# Patient Record
Sex: MALE | Race: WHITE | NOT HISPANIC OR LATINO | Employment: OTHER | URBAN - METROPOLITAN AREA
[De-identification: names, ages, dates, MRNs, and addresses within clinical notes are randomized per-mention and may not be internally consistent; named-entity substitution may affect disease eponyms.]

---

## 2021-01-13 DIAGNOSIS — Z23 NEED FOR VACCINATION: ICD-10-CM

## 2022-04-29 ENCOUNTER — HOSPITAL ENCOUNTER (OUTPATIENT)
Facility: MEDICAL CENTER | Age: 82
End: 2022-04-30
Attending: EMERGENCY MEDICINE | Admitting: INTERNAL MEDICINE
Payer: COMMERCIAL

## 2022-04-29 ENCOUNTER — APPOINTMENT (OUTPATIENT)
Dept: RADIOLOGY | Facility: MEDICAL CENTER | Age: 82
End: 2022-04-29
Attending: PHYSICIAN ASSISTANT
Payer: COMMERCIAL

## 2022-04-29 ENCOUNTER — ANESTHESIA (OUTPATIENT)
Dept: SURGERY | Facility: MEDICAL CENTER | Age: 82
End: 2022-04-29
Payer: COMMERCIAL

## 2022-04-29 ENCOUNTER — APPOINTMENT (OUTPATIENT)
Dept: RADIOLOGY | Facility: MEDICAL CENTER | Age: 82
End: 2022-04-29
Attending: EMERGENCY MEDICINE
Payer: COMMERCIAL

## 2022-04-29 ENCOUNTER — APPOINTMENT (OUTPATIENT)
Dept: RADIOLOGY | Facility: MEDICAL CENTER | Age: 82
End: 2022-04-29
Attending: ORTHOPAEDIC SURGERY
Payer: COMMERCIAL

## 2022-04-29 ENCOUNTER — ANESTHESIA EVENT (OUTPATIENT)
Dept: SURGERY | Facility: MEDICAL CENTER | Age: 82
End: 2022-04-29
Payer: COMMERCIAL

## 2022-04-29 DIAGNOSIS — S61.412A LACERATION OF LEFT HAND WITHOUT FOREIGN BODY, INITIAL ENCOUNTER: ICD-10-CM

## 2022-04-29 DIAGNOSIS — R73.9 HYPERGLYCEMIA: ICD-10-CM

## 2022-04-29 PROBLEM — D72.829 LEUCOCYTOSIS: Status: ACTIVE | Noted: 2022-04-29

## 2022-04-29 PROBLEM — R79.89 ELEVATED SERUM CREATININE: Status: ACTIVE | Noted: 2022-04-29

## 2022-04-29 LAB
ALBUMIN SERPL BCP-MCNC: 4.3 G/DL (ref 3.2–4.9)
ALBUMIN/GLOB SERPL: 1.7 G/DL
ALP SERPL-CCNC: 72 U/L (ref 30–99)
ALT SERPL-CCNC: 18 U/L (ref 2–50)
ANION GAP SERPL CALC-SCNC: 13 MMOL/L (ref 7–16)
AST SERPL-CCNC: 19 U/L (ref 12–45)
BASOPHILS # BLD AUTO: 0.4 % (ref 0–1.8)
BASOPHILS # BLD: 0.04 K/UL (ref 0–0.12)
BILIRUB SERPL-MCNC: 0.4 MG/DL (ref 0.1–1.5)
BUN SERPL-MCNC: 32 MG/DL (ref 8–22)
CALCIUM SERPL-MCNC: 9.1 MG/DL (ref 8.5–10.5)
CHLORIDE SERPL-SCNC: 106 MMOL/L (ref 96–112)
CO2 SERPL-SCNC: 20 MMOL/L (ref 20–33)
CREAT SERPL-MCNC: 1.59 MG/DL (ref 0.5–1.4)
EKG IMPRESSION: NORMAL
EOSINOPHIL # BLD AUTO: 0.05 K/UL (ref 0–0.51)
EOSINOPHIL NFR BLD: 0.4 % (ref 0–6.9)
ERYTHROCYTE [DISTWIDTH] IN BLOOD BY AUTOMATED COUNT: 51 FL (ref 35.9–50)
GFR SERPLBLD CREATININE-BSD FMLA CKD-EPI: 44 ML/MIN/1.73 M 2
GLOBULIN SER CALC-MCNC: 2.6 G/DL (ref 1.9–3.5)
GLUCOSE SERPL-MCNC: 134 MG/DL (ref 65–99)
HCT VFR BLD AUTO: 40.9 % (ref 42–52)
HGB BLD-MCNC: 13.8 G/DL (ref 14–18)
IMM GRANULOCYTES # BLD AUTO: 0.04 K/UL (ref 0–0.11)
IMM GRANULOCYTES NFR BLD AUTO: 0.4 % (ref 0–0.9)
LYMPHOCYTES # BLD AUTO: 1.54 K/UL (ref 1–4.8)
LYMPHOCYTES NFR BLD: 13.8 % (ref 22–41)
MCH RBC QN AUTO: 33.7 PG (ref 27–33)
MCHC RBC AUTO-ENTMCNC: 33.7 G/DL (ref 33.7–35.3)
MCV RBC AUTO: 99.8 FL (ref 81.4–97.8)
MONOCYTES # BLD AUTO: 0.78 K/UL (ref 0–0.85)
MONOCYTES NFR BLD AUTO: 7 % (ref 0–13.4)
NEUTROPHILS # BLD AUTO: 8.71 K/UL (ref 1.82–7.42)
NEUTROPHILS NFR BLD: 78 % (ref 44–72)
NRBC # BLD AUTO: 0 K/UL
NRBC BLD-RTO: 0 /100 WBC
PLATELET # BLD AUTO: 205 K/UL (ref 164–446)
PMV BLD AUTO: 9.7 FL (ref 9–12.9)
POTASSIUM SERPL-SCNC: 4.3 MMOL/L (ref 3.6–5.5)
PROT SERPL-MCNC: 6.9 G/DL (ref 6–8.2)
RBC # BLD AUTO: 4.1 M/UL (ref 4.7–6.1)
SODIUM SERPL-SCNC: 139 MMOL/L (ref 135–145)
WBC # BLD AUTO: 11.2 K/UL (ref 4.8–10.8)

## 2022-04-29 PROCEDURE — 700105 HCHG RX REV CODE 258: Performed by: INTERNAL MEDICINE

## 2022-04-29 PROCEDURE — 160009 HCHG ANES TIME/MIN: Performed by: ORTHOPAEDIC SURGERY

## 2022-04-29 PROCEDURE — 160002 HCHG RECOVERY MINUTES (STAT): Performed by: ORTHOPAEDIC SURGERY

## 2022-04-29 PROCEDURE — 64450 NJX AA&/STRD OTHER PN/BRANCH: CPT

## 2022-04-29 PROCEDURE — 700101 HCHG RX REV CODE 250: Performed by: STUDENT IN AN ORGANIZED HEALTH CARE EDUCATION/TRAINING PROGRAM

## 2022-04-29 PROCEDURE — 96365 THER/PROPH/DIAG IV INF INIT: CPT | Mod: XU

## 2022-04-29 PROCEDURE — 700111 HCHG RX REV CODE 636 W/ 250 OVERRIDE (IP): Performed by: INTERNAL MEDICINE

## 2022-04-29 PROCEDURE — 160027 HCHG SURGERY MINUTES - 1ST 30 MINS LEVEL 2: Performed by: ORTHOPAEDIC SURGERY

## 2022-04-29 PROCEDURE — 36415 COLL VENOUS BLD VENIPUNCTURE: CPT | Mod: XU

## 2022-04-29 PROCEDURE — 71045 X-RAY EXAM CHEST 1 VIEW: CPT

## 2022-04-29 PROCEDURE — A9270 NON-COVERED ITEM OR SERVICE: HCPCS | Performed by: INTERNAL MEDICINE

## 2022-04-29 PROCEDURE — 501330 HCHG SET, CYSTO IRRIG TUBING: Performed by: ORTHOPAEDIC SURGERY

## 2022-04-29 PROCEDURE — 160038 HCHG SURGERY MINUTES - EA ADDL 1 MIN LEVEL 2: Performed by: ORTHOPAEDIC SURGERY

## 2022-04-29 PROCEDURE — 85025 COMPLETE CBC W/AUTO DIFF WBC: CPT

## 2022-04-29 PROCEDURE — 36415 COLL VENOUS BLD VENIPUNCTURE: CPT

## 2022-04-29 PROCEDURE — 700111 HCHG RX REV CODE 636 W/ 250 OVERRIDE (IP)

## 2022-04-29 PROCEDURE — 96366 THER/PROPH/DIAG IV INF ADDON: CPT | Mod: XU

## 2022-04-29 PROCEDURE — 96375 TX/PRO/DX INJ NEW DRUG ADDON: CPT | Mod: XU

## 2022-04-29 PROCEDURE — 01830 ANES ARTHR/NDSC WRST/HND NOS: CPT | Performed by: STUDENT IN AN ORGANIZED HEALTH CARE EDUCATION/TRAINING PROGRAM

## 2022-04-29 PROCEDURE — 700111 HCHG RX REV CODE 636 W/ 250 OVERRIDE (IP): Performed by: ORTHOPAEDIC SURGERY

## 2022-04-29 PROCEDURE — 82607 VITAMIN B-12: CPT

## 2022-04-29 PROCEDURE — 700105 HCHG RX REV CODE 258: Performed by: EMERGENCY MEDICINE

## 2022-04-29 PROCEDURE — 93005 ELECTROCARDIOGRAM TRACING: CPT | Performed by: EMERGENCY MEDICINE

## 2022-04-29 PROCEDURE — G0378 HOSPITAL OBSERVATION PER HR: HCPCS

## 2022-04-29 PROCEDURE — 84443 ASSAY THYROID STIM HORMONE: CPT

## 2022-04-29 PROCEDURE — 700102 HCHG RX REV CODE 250 W/ 637 OVERRIDE(OP): Performed by: INTERNAL MEDICINE

## 2022-04-29 PROCEDURE — 90471 IMMUNIZATION ADMIN: CPT

## 2022-04-29 PROCEDURE — 700105 HCHG RX REV CODE 258: Performed by: STUDENT IN AN ORGANIZED HEALTH CARE EDUCATION/TRAINING PROGRAM

## 2022-04-29 PROCEDURE — 99100 ANES PT EXTEME AGE<1 YR&>70: CPT | Performed by: STUDENT IN AN ORGANIZED HEALTH CARE EDUCATION/TRAINING PROGRAM

## 2022-04-29 PROCEDURE — 160035 HCHG PACU - 1ST 60 MINS PHASE I: Performed by: ORTHOPAEDIC SURGERY

## 2022-04-29 PROCEDURE — 96367 TX/PROPH/DG ADDL SEQ IV INF: CPT | Mod: XU

## 2022-04-29 PROCEDURE — 99226 PR SUBSEQUENT OBSERVATION CARE,LEVEL III: CPT | Performed by: INTERNAL MEDICINE

## 2022-04-29 PROCEDURE — 73130 X-RAY EXAM OF HAND: CPT | Mod: LT

## 2022-04-29 PROCEDURE — 501838 HCHG SUTURE GENERAL: Performed by: ORTHOPAEDIC SURGERY

## 2022-04-29 PROCEDURE — 90715 TDAP VACCINE 7 YRS/> IM: CPT | Performed by: EMERGENCY MEDICINE

## 2022-04-29 PROCEDURE — 80053 COMPREHEN METABOLIC PANEL: CPT

## 2022-04-29 PROCEDURE — 700111 HCHG RX REV CODE 636 W/ 250 OVERRIDE (IP): Performed by: EMERGENCY MEDICINE

## 2022-04-29 PROCEDURE — 99291 CRITICAL CARE FIRST HOUR: CPT

## 2022-04-29 PROCEDURE — 700111 HCHG RX REV CODE 636 W/ 250 OVERRIDE (IP): Performed by: STUDENT IN AN ORGANIZED HEALTH CARE EDUCATION/TRAINING PROGRAM

## 2022-04-29 PROCEDURE — 99140 ANES COMP EMERGENCY COND: CPT | Performed by: STUDENT IN AN ORGANIZED HEALTH CARE EDUCATION/TRAINING PROGRAM

## 2022-04-29 PROCEDURE — 160048 HCHG OR STATISTICAL LEVEL 1-5: Performed by: ORTHOPAEDIC SURGERY

## 2022-04-29 RX ORDER — HYDROMORPHONE HYDROCHLORIDE 2 MG/ML
INJECTION, SOLUTION INTRAMUSCULAR; INTRAVENOUS; SUBCUTANEOUS PRN
Status: DISCONTINUED | OUTPATIENT
Start: 2022-04-29 | End: 2022-04-29 | Stop reason: SURG

## 2022-04-29 RX ORDER — HYDROMORPHONE HYDROCHLORIDE 1 MG/ML
0.1 INJECTION, SOLUTION INTRAMUSCULAR; INTRAVENOUS; SUBCUTANEOUS
Status: DISCONTINUED | OUTPATIENT
Start: 2022-04-29 | End: 2022-04-29 | Stop reason: HOSPADM

## 2022-04-29 RX ORDER — BUPIVACAINE HYDROCHLORIDE 2.5 MG/ML
30 INJECTION, SOLUTION EPIDURAL; INFILTRATION; INTRACAUDAL ONCE
Status: DISCONTINUED | OUTPATIENT
Start: 2022-04-29 | End: 2022-04-30 | Stop reason: HOSPADM

## 2022-04-29 RX ORDER — ONDANSETRON 2 MG/ML
INJECTION INTRAMUSCULAR; INTRAVENOUS PRN
Status: DISCONTINUED | OUTPATIENT
Start: 2022-04-29 | End: 2022-04-29 | Stop reason: SURG

## 2022-04-29 RX ORDER — ONDANSETRON 2 MG/ML
4 INJECTION INTRAMUSCULAR; INTRAVENOUS EVERY 4 HOURS PRN
Status: DISCONTINUED | OUTPATIENT
Start: 2022-04-29 | End: 2022-04-30 | Stop reason: HOSPADM

## 2022-04-29 RX ORDER — DIPHENHYDRAMINE HYDROCHLORIDE 50 MG/ML
12.5 INJECTION INTRAMUSCULAR; INTRAVENOUS
Status: DISCONTINUED | OUTPATIENT
Start: 2022-04-29 | End: 2022-04-29 | Stop reason: HOSPADM

## 2022-04-29 RX ORDER — HALOPERIDOL 5 MG/ML
1 INJECTION INTRAMUSCULAR
Status: DISCONTINUED | OUTPATIENT
Start: 2022-04-29 | End: 2022-04-29 | Stop reason: HOSPADM

## 2022-04-29 RX ORDER — ONDANSETRON 2 MG/ML
4 INJECTION INTRAMUSCULAR; INTRAVENOUS ONCE
Status: COMPLETED | OUTPATIENT
Start: 2022-04-29 | End: 2022-04-29

## 2022-04-29 RX ORDER — AMOXICILLIN 250 MG
2 CAPSULE ORAL 2 TIMES DAILY
Status: DISCONTINUED | OUTPATIENT
Start: 2022-04-29 | End: 2022-04-30 | Stop reason: HOSPADM

## 2022-04-29 RX ORDER — HYDROMORPHONE HYDROCHLORIDE 1 MG/ML
0.4 INJECTION, SOLUTION INTRAMUSCULAR; INTRAVENOUS; SUBCUTANEOUS
Status: DISCONTINUED | OUTPATIENT
Start: 2022-04-29 | End: 2022-04-29 | Stop reason: HOSPADM

## 2022-04-29 RX ORDER — SODIUM CHLORIDE, SODIUM LACTATE, POTASSIUM CHLORIDE, CALCIUM CHLORIDE 600; 310; 30; 20 MG/100ML; MG/100ML; MG/100ML; MG/100ML
INJECTION, SOLUTION INTRAVENOUS
Status: DISCONTINUED | OUTPATIENT
Start: 2022-04-29 | End: 2022-04-29 | Stop reason: SURG

## 2022-04-29 RX ORDER — ENOXAPARIN SODIUM 100 MG/ML
40 INJECTION SUBCUTANEOUS DAILY
Status: DISCONTINUED | OUTPATIENT
Start: 2022-04-30 | End: 2022-04-30 | Stop reason: HOSPADM

## 2022-04-29 RX ORDER — MORPHINE SULFATE 4 MG/ML
4 INJECTION INTRAVENOUS ONCE
Status: COMPLETED | OUTPATIENT
Start: 2022-04-29 | End: 2022-04-29

## 2022-04-29 RX ORDER — ACETAMINOPHEN 325 MG/1
650 TABLET ORAL EVERY 6 HOURS PRN
Status: DISCONTINUED | OUTPATIENT
Start: 2022-04-29 | End: 2022-04-30 | Stop reason: HOSPADM

## 2022-04-29 RX ORDER — ONDANSETRON 2 MG/ML
4 INJECTION INTRAMUSCULAR; INTRAVENOUS
Status: DISCONTINUED | OUTPATIENT
Start: 2022-04-29 | End: 2022-04-29 | Stop reason: HOSPADM

## 2022-04-29 RX ORDER — NORTRIPTYLINE HYDROCHLORIDE 50 MG/1
50 CAPSULE ORAL
COMMUNITY

## 2022-04-29 RX ORDER — HYDROMORPHONE HYDROCHLORIDE 1 MG/ML
INJECTION, SOLUTION INTRAMUSCULAR; INTRAVENOUS; SUBCUTANEOUS
Status: COMPLETED
Start: 2022-04-29 | End: 2022-04-29

## 2022-04-29 RX ORDER — HYDROCODONE BITARTRATE AND ACETAMINOPHEN 7.5; 325 MG/1; MG/1
1 TABLET ORAL EVERY 6 HOURS PRN
COMMUNITY

## 2022-04-29 RX ORDER — OXYCODONE HYDROCHLORIDE 5 MG/1
2.5 TABLET ORAL
Status: DISCONTINUED | OUTPATIENT
Start: 2022-04-29 | End: 2022-04-30 | Stop reason: HOSPADM

## 2022-04-29 RX ORDER — BISACODYL 10 MG
10 SUPPOSITORY, RECTAL RECTAL
Status: DISCONTINUED | OUTPATIENT
Start: 2022-04-29 | End: 2022-04-30 | Stop reason: HOSPADM

## 2022-04-29 RX ORDER — MORPHINE SULFATE 4 MG/ML
2 INJECTION INTRAVENOUS
Status: DISCONTINUED | OUTPATIENT
Start: 2022-04-29 | End: 2022-04-30 | Stop reason: HOSPADM

## 2022-04-29 RX ORDER — PHENYLEPHRINE HYDROCHLORIDE 10 MG/ML
INJECTION, SOLUTION INTRAMUSCULAR; INTRAVENOUS; SUBCUTANEOUS PRN
Status: DISCONTINUED | OUTPATIENT
Start: 2022-04-29 | End: 2022-04-29 | Stop reason: SURG

## 2022-04-29 RX ORDER — CEFAZOLIN SODIUM 2 G/100ML
2 INJECTION, SOLUTION INTRAVENOUS EVERY 8 HOURS
Status: COMPLETED | OUTPATIENT
Start: 2022-04-29 | End: 2022-04-30

## 2022-04-29 RX ORDER — DEXAMETHASONE SODIUM PHOSPHATE 4 MG/ML
INJECTION, SOLUTION INTRA-ARTICULAR; INTRALESIONAL; INTRAMUSCULAR; INTRAVENOUS; SOFT TISSUE PRN
Status: DISCONTINUED | OUTPATIENT
Start: 2022-04-29 | End: 2022-04-29 | Stop reason: SURG

## 2022-04-29 RX ORDER — POLYETHYLENE GLYCOL 3350 17 G/17G
1 POWDER, FOR SOLUTION ORAL
Status: DISCONTINUED | OUTPATIENT
Start: 2022-04-29 | End: 2022-04-30 | Stop reason: HOSPADM

## 2022-04-29 RX ORDER — ONDANSETRON 4 MG/1
4 TABLET, ORALLY DISINTEGRATING ORAL EVERY 4 HOURS PRN
Status: DISCONTINUED | OUTPATIENT
Start: 2022-04-29 | End: 2022-04-30 | Stop reason: HOSPADM

## 2022-04-29 RX ORDER — OXYCODONE HCL 5 MG/5 ML
5 SOLUTION, ORAL ORAL
Status: DISCONTINUED | OUTPATIENT
Start: 2022-04-29 | End: 2022-04-29 | Stop reason: HOSPADM

## 2022-04-29 RX ORDER — HYDROMORPHONE HYDROCHLORIDE 1 MG/ML
0.2 INJECTION, SOLUTION INTRAMUSCULAR; INTRAVENOUS; SUBCUTANEOUS
Status: DISCONTINUED | OUTPATIENT
Start: 2022-04-29 | End: 2022-04-29 | Stop reason: HOSPADM

## 2022-04-29 RX ORDER — OXYCODONE HCL 5 MG/5 ML
10 SOLUTION, ORAL ORAL
Status: DISCONTINUED | OUTPATIENT
Start: 2022-04-29 | End: 2022-04-29 | Stop reason: HOSPADM

## 2022-04-29 RX ORDER — HYDRALAZINE HYDROCHLORIDE 20 MG/ML
5 INJECTION INTRAMUSCULAR; INTRAVENOUS
Status: DISCONTINUED | OUTPATIENT
Start: 2022-04-29 | End: 2022-04-29 | Stop reason: HOSPADM

## 2022-04-29 RX ORDER — LABETALOL HYDROCHLORIDE 5 MG/ML
5 INJECTION, SOLUTION INTRAVENOUS
Status: DISCONTINUED | OUTPATIENT
Start: 2022-04-29 | End: 2022-04-29 | Stop reason: HOSPADM

## 2022-04-29 RX ORDER — LABETALOL HYDROCHLORIDE 5 MG/ML
INJECTION, SOLUTION INTRAVENOUS PRN
Status: DISCONTINUED | OUTPATIENT
Start: 2022-04-29 | End: 2022-04-29 | Stop reason: HOSPADM

## 2022-04-29 RX ORDER — LIDOCAINE HCL/EPINEPHRINE/PF 2%-1:200K
10 VIAL (ML) INJECTION ONCE
Status: DISCONTINUED | OUTPATIENT
Start: 2022-04-29 | End: 2022-04-30 | Stop reason: HOSPADM

## 2022-04-29 RX ORDER — METOPROLOL TARTRATE 1 MG/ML
1 INJECTION, SOLUTION INTRAVENOUS
Status: DISCONTINUED | OUTPATIENT
Start: 2022-04-29 | End: 2022-04-29 | Stop reason: HOSPADM

## 2022-04-29 RX ORDER — OXYCODONE HYDROCHLORIDE 5 MG/1
5 TABLET ORAL
Status: DISCONTINUED | OUTPATIENT
Start: 2022-04-29 | End: 2022-04-30 | Stop reason: HOSPADM

## 2022-04-29 RX ADMIN — PHENYLEPHRINE HYDROCHLORIDE 200 MCG: 10 INJECTION INTRAVENOUS at 19:15

## 2022-04-29 RX ADMIN — PHENYLEPHRINE HYDROCHLORIDE 200 MCG: 10 INJECTION INTRAVENOUS at 19:28

## 2022-04-29 RX ADMIN — DEXAMETHASONE SODIUM PHOSPHATE 4 MG: 4 INJECTION, SOLUTION INTRA-ARTICULAR; INTRALESIONAL; INTRAMUSCULAR; INTRAVENOUS; SOFT TISSUE at 18:48

## 2022-04-29 RX ADMIN — HYDROMORPHONE HYDROCHLORIDE 1 MG: 1 INJECTION, SOLUTION INTRAMUSCULAR; INTRAVENOUS; SUBCUTANEOUS at 17:31

## 2022-04-29 RX ADMIN — HYDROMORPHONE HYDROCHLORIDE 0.8 MG: 2 INJECTION INTRAMUSCULAR; INTRAVENOUS; SUBCUTANEOUS at 18:32

## 2022-04-29 RX ADMIN — OXYCODONE 2.5 MG: 5 TABLET ORAL at 21:25

## 2022-04-29 RX ADMIN — SODIUM CHLORIDE, POTASSIUM CHLORIDE, SODIUM LACTATE AND CALCIUM CHLORIDE: 600; 310; 30; 20 INJECTION, SOLUTION INTRAVENOUS at 18:26

## 2022-04-29 RX ADMIN — LABETALOL HYDROCHLORIDE 10 MG: 5 INJECTION, SOLUTION INTRAVENOUS at 18:47

## 2022-04-29 RX ADMIN — PHENYLEPHRINE HYDROCHLORIDE 200 MCG: 10 INJECTION INTRAVENOUS at 19:19

## 2022-04-29 RX ADMIN — SUGAMMADEX 200 MG: 100 INJECTION, SOLUTION INTRAVENOUS at 19:19

## 2022-04-29 RX ADMIN — MORPHINE SULFATE 4 MG: 4 INJECTION INTRAVENOUS at 16:20

## 2022-04-29 RX ADMIN — CLOSTRIDIUM TETANI TOXOID ANTIGEN (FORMALDEHYDE INACTIVATED), CORYNEBACTERIUM DIPHTHERIAE TOXOID ANTIGEN (FORMALDEHYDE INACTIVATED), BORDETELLA PERTUSSIS TOXOID ANTIGEN (GLUTARALDEHYDE INACTIVATED), BORDETELLA PERTUSSIS FILAMENTOUS HEMAGGLUTININ ANTIGEN (FORMALDEHYDE INACTIVATED), BORDETELLA PERTUSSIS PERTACTIN ANTIGEN, AND BORDETELLA PERTUSSIS FIMBRIAE 2/3 ANTIGEN 0.5 ML: 5; 2; 2.5; 5; 3; 5 INJECTION, SUSPENSION INTRAMUSCULAR at 17:31

## 2022-04-29 RX ADMIN — ALFENTANIL HYDROCHLORIDE 1000 MCG: 500 INJECTION INTRAVENOUS at 18:31

## 2022-04-29 RX ADMIN — ONDANSETRON 4 MG: 2 INJECTION INTRAMUSCULAR; INTRAVENOUS at 19:21

## 2022-04-29 RX ADMIN — SODIUM CHLORIDE 3 G: 900 INJECTION INTRAVENOUS at 23:19

## 2022-04-29 RX ADMIN — SODIUM CHLORIDE 3 G: 900 INJECTION INTRAVENOUS at 17:30

## 2022-04-29 RX ADMIN — ONDANSETRON 4 MG: 2 INJECTION INTRAMUSCULAR; INTRAVENOUS at 16:19

## 2022-04-29 RX ADMIN — CEFAZOLIN SODIUM 2 G: 2 INJECTION, SOLUTION INTRAVENOUS at 22:11

## 2022-04-29 ASSESSMENT — PAIN DESCRIPTION - PAIN TYPE
TYPE: ACUTE PAIN;SURGICAL PAIN

## 2022-04-29 ASSESSMENT — PATIENT HEALTH QUESTIONNAIRE - PHQ9
SUM OF ALL RESPONSES TO PHQ9 QUESTIONS 1 AND 2: 0
1. LITTLE INTEREST OR PLEASURE IN DOING THINGS: NOT AT ALL
2. FEELING DOWN, DEPRESSED, IRRITABLE, OR HOPELESS: NOT AT ALL

## 2022-04-29 ASSESSMENT — PAIN SCALES - GENERAL: PAIN_LEVEL: 5

## 2022-04-29 NOTE — ED PROVIDER NOTES
ED Provider Note    CHIEF COMPLAINT  Chief Complaint   Patient presents with   • T-5000 Lacerations       HPI  Alok Pickett is a 81 y.o. male who presents after sustaining laceration to his left hand while using a skill saw.  Patient denies any associated numbness of his hand.  Patient denies any other injury sustained.  He denies use of anticoagulants.  Patient does report there was a considerable amount of bleeding and some of the blood appeared to be squirting.  Patient is unsure of his last tetanus.    REVIEW OF SYSTEMS  ROS  See HPI for further details. All other systems are negative.     PAST MEDICAL HISTORY       SOCIAL HISTORY  Social History     Tobacco Use   • Smoking status: Not on file   • Smokeless tobacco: Never Used   Vaping Use   • Vaping Use: Never used   Substance and Sexual Activity   • Alcohol use: Yes     Comment: occ   • Drug use: Never   • Sexual activity: Not on file       SURGICAL HISTORY  patient denies any surgical history    CURRENT MEDICATIONS  Home Medications     Reviewed by Mima Burnett R.N. (Registered Nurse) on 04/29/22 at 1548  Med List Status: Partial   Medication Last Dose Status        Patient Otf Taking any Medications                       ALLERGIES  No Known Allergies    PHYSICAL EXAM  Vitals:    04/29/22 1554   BP: (!) 175/77   Pulse: 83   Resp: 16   Temp: 36.1 °C (97 °F)   SpO2: 95%       Physical Exam  Constitutional:       Appearance: He is well-developed.   Eyes:      Conjunctiva/sclera: Conjunctivae normal.   Cardiovascular:      Rate and Rhythm: Normal rate and regular rhythm.   Pulmonary:      Effort: Pulmonary effort is normal.      Breath sounds: Normal breath sounds.   Abdominal:      General: Abdomen is flat.   Musculoskeletal:      Cervical back: Normal range of motion and neck supple.      Comments: Large macerated laceration on the dorsum of patient's left hand.  Laceration extends over the entirety of the hands metacarpals, it is most deep at  the second and third metacarpal.  He has obvious interruption of the extensor tendon of the second digit.  Patient has sensation in ulnar radial and median distribution.  Patient is unable to extend his second digit, 0-5 strength here, he has 2 out of 5 strength for extension of his third digit.  Fourth and fifth digit are unaffected.  Patient with full flexion throughout, there is no associated palmar lesions.  Distal pulses are 2+.  Cap refill is instantaneous.  Patient does have some briskly bleeding venous oozing   Skin:     General: Skin is warm.   Neurological:      Mental Status: He is alert and oriented to person, place, and time.   Psychiatric:         Behavior: Behavior normal.       Patient verbally consented for wrist block of his left hand.  Site confirmed  Using 3 cc of bupivacaine at the anatomical landmarks of the ulnar nerve at the wrist was injected, this was then repeated at the median and radial areas.  Initially patient did not have any relief however after about 20 minutes patient developed relief and decreased sensation in his hand.      30 minutes of critical care were spent with this patient    COURSE & MEDICAL DECISION MAKING  Pertinent Labs & Imaging studies reviewed. (See chart for details)    Patient here with complex laceration to his left hand.  I checked an x-ray, this reveals a comminuted fracture of his second and third metacarpals.  Patient given Unasyn.  Patient tetanus updated.  Patient with open fracture and obvious tendon involvement.  I discussed the case with orthopedics who will take patient to the operating room.  Patient was given morphine however this failed to resolve his pain, he was also given Dilaudid, and I performed a wrist block as above.  Patient tolerated well.  Patient sent to the operating room for the emergency department.      FINAL IMPRESSION  1.  Open fracture of left second and third metacarpals, hand laceration, tendon laceration    Electronically signed  by: Alok Palacios M.D., 4/29/2022 4:11 PM

## 2022-04-29 NOTE — PROGRESS NOTES
81M hx HTN and chronic LBP skillsaw inj to nondominant dorsum left hand. Venous transection with brisk flow - compression dressing applied.  Multiple avulsions > 10 cm twixt rays 2-3 and 4-5  Comminuted midshaft 2nd MC fx  Loss of dorsal tendons, cannot extend fingers  Volar/flex intact  SILT M/U/R fingertips    A/ open LH fx 2nd MC  P/OR for I+D , surgical tx  Has been npo since bkfst    P/Covid screening  Formal consult pending  CT w/o if time allows  Maintain pressure and elevation above heart level  OK for regional block while awaiting surgery  To OR for I+D / ORPP  Recommend Med admission for obs  Likely DC tomorrow with hand followup next week if all goes well

## 2022-04-29 NOTE — ED TRIAGE NOTES
Chief Complaint   Patient presents with   • T-5000 Lacerations     Pt cut the top of his left hand with a skill saw today around 1430. He reports squirting of blood and probably two pints of blood loss. CMS is intact distally. Bleeding is controlled now with a pressure dressing placed by EMS en route. Pt received a total of 200 mcg fentanyl en route.     Pt reports feeling somewhat dizzy. But otherwise appropriately conversational.     Significant PVCs that don't appear to be perfusing.

## 2022-04-30 VITALS
HEART RATE: 58 BPM | RESPIRATION RATE: 17 BRPM | DIASTOLIC BLOOD PRESSURE: 66 MMHG | TEMPERATURE: 97 F | WEIGHT: 195 LBS | BODY MASS INDEX: 29.55 KG/M2 | OXYGEN SATURATION: 94 % | HEIGHT: 68 IN | SYSTOLIC BLOOD PRESSURE: 146 MMHG

## 2022-04-30 PROBLEM — R73.9 HYPERGLYCEMIA: Status: RESOLVED | Noted: 2022-04-29 | Resolved: 2022-04-30

## 2022-04-30 LAB
ALBUMIN SERPL BCP-MCNC: 4.2 G/DL (ref 3.2–4.9)
ALBUMIN/GLOB SERPL: 1.6 G/DL
ALP SERPL-CCNC: 66 U/L (ref 30–99)
ALT SERPL-CCNC: 15 U/L (ref 2–50)
ANION GAP SERPL CALC-SCNC: 14 MMOL/L (ref 7–16)
AST SERPL-CCNC: 16 U/L (ref 12–45)
BILIRUB SERPL-MCNC: 0.5 MG/DL (ref 0.1–1.5)
BUN SERPL-MCNC: 27 MG/DL (ref 8–22)
CALCIUM SERPL-MCNC: 9 MG/DL (ref 8.5–10.5)
CHLORIDE SERPL-SCNC: 103 MMOL/L (ref 96–112)
CO2 SERPL-SCNC: 21 MMOL/L (ref 20–33)
CREAT SERPL-MCNC: 1.47 MG/DL (ref 0.5–1.4)
ERYTHROCYTE [DISTWIDTH] IN BLOOD BY AUTOMATED COUNT: 51.7 FL (ref 35.9–50)
GFR SERPLBLD CREATININE-BSD FMLA CKD-EPI: 48 ML/MIN/1.73 M 2
GLOBULIN SER CALC-MCNC: 2.7 G/DL (ref 1.9–3.5)
GLUCOSE SERPL-MCNC: 179 MG/DL (ref 65–99)
HCT VFR BLD AUTO: 41.5 % (ref 42–52)
HGB BLD-MCNC: 13.7 G/DL (ref 14–18)
MCH RBC QN AUTO: 33.3 PG (ref 27–33)
MCHC RBC AUTO-ENTMCNC: 33 G/DL (ref 33.7–35.3)
MCV RBC AUTO: 101 FL (ref 81.4–97.8)
PLATELET # BLD AUTO: 210 K/UL (ref 164–446)
PMV BLD AUTO: 10.3 FL (ref 9–12.9)
POTASSIUM SERPL-SCNC: 4.6 MMOL/L (ref 3.6–5.5)
PROT SERPL-MCNC: 6.9 G/DL (ref 6–8.2)
RBC # BLD AUTO: 4.11 M/UL (ref 4.7–6.1)
SODIUM SERPL-SCNC: 138 MMOL/L (ref 135–145)
TSH SERPL DL<=0.005 MIU/L-ACNC: 2.04 UIU/ML (ref 0.38–5.33)
VIT B12 SERPL-MCNC: 1309 PG/ML (ref 211–911)
WBC # BLD AUTO: 13.9 K/UL (ref 4.8–10.8)

## 2022-04-30 PROCEDURE — 96372 THER/PROPH/DIAG INJ SC/IM: CPT

## 2022-04-30 PROCEDURE — 700105 HCHG RX REV CODE 258: Performed by: HOSPITALIST

## 2022-04-30 PROCEDURE — 96366 THER/PROPH/DIAG IV INF ADDON: CPT

## 2022-04-30 PROCEDURE — 700111 HCHG RX REV CODE 636 W/ 250 OVERRIDE (IP): Performed by: ORTHOPAEDIC SURGERY

## 2022-04-30 PROCEDURE — 700102 HCHG RX REV CODE 250 W/ 637 OVERRIDE(OP): Performed by: INTERNAL MEDICINE

## 2022-04-30 PROCEDURE — 700111 HCHG RX REV CODE 636 W/ 250 OVERRIDE (IP): Performed by: INTERNAL MEDICINE

## 2022-04-30 PROCEDURE — 51798 US URINE CAPACITY MEASURE: CPT

## 2022-04-30 PROCEDURE — 97165 OT EVAL LOW COMPLEX 30 MIN: CPT

## 2022-04-30 PROCEDURE — 99217 PR OBSERVATION CARE DISCHARGE: CPT | Performed by: HOSPITALIST

## 2022-04-30 PROCEDURE — 97161 PT EVAL LOW COMPLEX 20 MIN: CPT

## 2022-04-30 PROCEDURE — 36415 COLL VENOUS BLD VENIPUNCTURE: CPT

## 2022-04-30 PROCEDURE — A9270 NON-COVERED ITEM OR SERVICE: HCPCS | Performed by: INTERNAL MEDICINE

## 2022-04-30 PROCEDURE — G0378 HOSPITAL OBSERVATION PER HR: HCPCS

## 2022-04-30 PROCEDURE — 700105 HCHG RX REV CODE 258: Performed by: INTERNAL MEDICINE

## 2022-04-30 PROCEDURE — 85027 COMPLETE CBC AUTOMATED: CPT

## 2022-04-30 PROCEDURE — 80053 COMPREHEN METABOLIC PANEL: CPT

## 2022-04-30 RX ORDER — AMOXICILLIN AND CLAVULANATE POTASSIUM 875; 125 MG/1; MG/1
1 TABLET, FILM COATED ORAL 2 TIMES DAILY
Qty: 10 TABLET | Refills: 0 | Status: SHIPPED | OUTPATIENT
Start: 2022-04-30 | End: 2022-05-05

## 2022-04-30 RX ORDER — SODIUM CHLORIDE 9 MG/ML
INJECTION, SOLUTION INTRAVENOUS CONTINUOUS
Status: DISCONTINUED | OUTPATIENT
Start: 2022-04-30 | End: 2022-04-30

## 2022-04-30 RX ORDER — POLYETHYLENE GLYCOL 3350 17 G/17G
17 POWDER, FOR SOLUTION ORAL
Qty: 15 EACH | Refills: 3 | Status: SHIPPED | OUTPATIENT
Start: 2022-04-30

## 2022-04-30 RX ORDER — AMOXICILLIN 250 MG
2 CAPSULE ORAL 2 TIMES DAILY
Qty: 30 TABLET | Refills: 0 | Status: SHIPPED | OUTPATIENT
Start: 2022-04-30

## 2022-04-30 RX ADMIN — ENOXAPARIN SODIUM 40 MG: 40 INJECTION SUBCUTANEOUS at 05:18

## 2022-04-30 RX ADMIN — SENNOSIDES AND DOCUSATE SODIUM 2 TABLET: 50; 8.6 TABLET ORAL at 05:17

## 2022-04-30 RX ADMIN — SODIUM CHLORIDE: 9 INJECTION, SOLUTION INTRAVENOUS at 08:49

## 2022-04-30 RX ADMIN — OXYCODONE 2.5 MG: 5 TABLET ORAL at 06:45

## 2022-04-30 RX ADMIN — SODIUM CHLORIDE 3 G: 900 INJECTION INTRAVENOUS at 05:12

## 2022-04-30 RX ADMIN — SODIUM CHLORIDE 3 G: 900 INJECTION INTRAVENOUS at 11:11

## 2022-04-30 RX ADMIN — OXYCODONE 5 MG: 5 TABLET ORAL at 00:38

## 2022-04-30 RX ADMIN — CEFAZOLIN SODIUM 2 G: 2 INJECTION, SOLUTION INTRAVENOUS at 05:56

## 2022-04-30 RX ADMIN — OXYCODONE 5 MG: 5 TABLET ORAL at 11:11

## 2022-04-30 ASSESSMENT — COGNITIVE AND FUNCTIONAL STATUS - GENERAL
DAILY ACTIVITIY SCORE: 24
SUGGESTED CMS G CODE MODIFIER DAILY ACTIVITY: CH
CLIMB 3 TO 5 STEPS WITH RAILING: A LITTLE
SUGGESTED CMS G CODE MODIFIER MOBILITY: CI
MOBILITY SCORE: 23

## 2022-04-30 ASSESSMENT — PAIN DESCRIPTION - PAIN TYPE
TYPE: ACUTE PAIN;SURGICAL PAIN
TYPE: ACUTE PAIN
TYPE: ACUTE PAIN;SURGICAL PAIN

## 2022-04-30 ASSESSMENT — LIFESTYLE VARIABLES
HOW MANY TIMES IN THE PAST YEAR HAVE YOU HAD 5 OR MORE DRINKS IN A DAY: 0
EVER HAD A DRINK FIRST THING IN THE MORNING TO STEADY YOUR NERVES TO GET RID OF A HANGOVER: NO
TOTAL SCORE: 1
CONSUMPTION TOTAL: NEGATIVE
ALCOHOL_USE: YES
HAVE PEOPLE ANNOYED YOU BY CRITICIZING YOUR DRINKING: YES
AVERAGE NUMBER OF DAYS PER WEEK YOU HAVE A DRINK CONTAINING ALCOHOL: 2
TOTAL SCORE: 1
TOTAL SCORE: 1
HAVE YOU EVER FELT YOU SHOULD CUT DOWN ON YOUR DRINKING: NO
ON A TYPICAL DAY WHEN YOU DRINK ALCOHOL HOW MANY DRINKS DO YOU HAVE: 2
DOES PATIENT WANT TO STOP DRINKING: NO
EVER FELT BAD OR GUILTY ABOUT YOUR DRINKING: NO

## 2022-04-30 ASSESSMENT — GAIT ASSESSMENTS
DISTANCE (FEET): 500
GAIT LEVEL OF ASSIST: STANDBY ASSIST
DEVIATION: NO DEVIATION

## 2022-04-30 ASSESSMENT — ACTIVITIES OF DAILY LIVING (ADL): TOILETING: INDEPENDENT

## 2022-04-30 NOTE — CONSULTS
DATE OF SERVICE:  04/29/2022     ORTHOPEDIC CONSULTATION     REQUESTING PHYSICIAN:  Alok Palacios MD, Emergency Department.     REASON FOR CONSULTATION:  Left hand laceration from a circular saw with open   metacarpal fracture and extensor tendon lacerations.     CHIEF COMPLAINT:  Left hand pain.     HISTORY OF PRESENT ILLNESS:  The patient is a 81-year-old male who was using a   circular saw to cut up scrap wood and the saw kicked and contacted the gloves   he was wearing, sustaining complex lacerations to left hand.  He presented to   [facility] Emergency Department and was found to have open wound to the hand with   open metacarpal fractures and extensor tendon injury.  He did receive Ancef   in the emergency department as well as compressive dressing and he had nerve   block performed by Dr. Palacios in the emergency department for pain control.    He denies other injuries.  He is right-hand dominant.     PAST MEDICAL HISTORY:  ALLERGIES:  No known drug allergies.     OUTPATIENT MEDICATIONS:  Nortriptyline, Norco.     PAST MEDICAL DIAGNOSIS:  Hypertension.     PAST SURGICAL HISTORY:  Back surgery consistent with fusion with   instrumentation.     SOCIAL HISTORY:  The patient denies smoking and denies illicit drug use.     PHYSICAL EXAMINATION:  VITAL SIGNS:  Temperature is 97, heart rate 102, respiratory rate 22, blood   pressure 178/76, pulse oximetry 94% on room air.  GENERAL APPEARANCE:  The patient is alert and oriented, pleasant, cooperative,   in no acute distress.  MUSCULOSKELETAL:  Left upper extremity has compressive Ace bandage and gauze   dressing to the dorsum of the left hand.  He has limited ability to extend his   middle finger, index finger and thumb.  He is able to flex all those fingers.    He has brisk capillary refill in the fingers.  He has diminished light touch   sensation in the median nerve distribution.  He has intact sensation in the   ulnar nerve distribution but he did have a numbing  procedure performed here in   the emergency department.     DIAGNOSTIC IMAGING:  Plain x-rays of left hand shows a comminuted fracture of   the distal portion of the second metacarpal.     ASSESSMENT:  A 81-year-old male with a circular saw injury to left hand and   resulting in an open second metacarpal fracture, extensor tendon lacerations   involving the middle, index and likely the thumb.     RECOMMENDATIONS:  1.  I discussed these findings with the patient.  I do recommend going to the   operating room for wound exploration, debridement, possible fixation of his   fracture and possible soft tissue repair depending on the complexity of the   injury.  We did discuss that if it is in fact quite complex and I will likely   just debride the wound, closed the skin, splint him and sent him on an   outpatient basis to follow up with my colleague who is a hand upper extremity   specialist for definitive surgical management.  He expressed comfort and   understanding with this plan.  2.  The patient is currently n.p.o. and make preparations to get in the   operating room as soon as possible for surgical management.        ______________________________  MD VEL Man/XIOMARA    DD:  04/29/2022 18:21  DT:  04/29/2022 18:56    Job#:  [id number]

## 2022-04-30 NOTE — ANESTHESIA POSTPROCEDURE EVALUATION
Patient: Alok Pickett    Procedure Summary     Date: 04/29/22 Room / Location: [place] [address] / SURGERY [facility]    Anesthesia Start: 1826 Anesthesia Stop:     Procedure: INCISION AND DRAINAGE, WOUND, BY ORTHOPEDICS (Left ) Diagnosis: (METACARPAL 1st, 2-4 OPEN FRACTURE, RING FINGER OPEN FRACTURE, PROXIMAL PHALANX, EXTENSION TENDON INJURY, THUMB INDEX MIDDLE RING FINGERS)    Surgeons: Kevin Bazan M.D. Responsible Provider: Conrado Anderson M.D.    Anesthesia Type: general ASA Status: 3 - Emergent          Final Anesthesia Type: general  Last vitals  BP   Blood Pressure : 155/88    Temp   36.3 °C (97.4 °F)    Pulse   74   Resp   17    SpO2   97 %      Anesthesia Post Evaluation    Patient location during evaluation: PACU  Patient participation: complete - patient participated  Level of consciousness: awake and alert  Pain score: 5    Airway patency: patent  Anesthetic complications: no  Cardiovascular status: hemodynamically stable  Respiratory status: acceptable  Hydration status: euvolemic    PONV: none          No complications documented.     Nurse Pain Score: 5 (NPRS)

## 2022-04-30 NOTE — OR SURGEON
Immediate Post OP Note    PreOp Diagnosis: Complex open laceration to left hand with extensor tendon lacerations, open 2nd MC fx      PostOp Diagnosis: Complex open laceration to left hand with multiple extensor tendon lacerations, open 1st-4th MC fxs, open 4ths proximal phalanx fx, traumatic arthrotomy 4th MCP joint      Procedure(s):  INCISION AND DRAINAGE, WOUND, BY ORTHOPEDICS - Wound Class: Contaminated    Surgeon(s):  Kevin Bazan M.D.    Anesthesiologist/Type of Anesthesia:  Anesthesiologist: Conrado Anderson M.D./General    Surgical Staff:  Circulator: Leatha Heller R.N.  Relief Circulator: Tuan Boswell R.N.  Relief Scrub: Jeannie Irving  Scrub Person: Azam Ferguson    Specimens removed if any:  * No specimens in log *    Estimated Blood Loss: minimal    Findings: see dictation    Complications: none known    PLAN:  --admit obs for abx overnight  --discharge to home tomorrow  --fu early next week as outpatient with either Dr. Bautista or Minerva  --NWB LUE in splint with sling for comfort        4/29/2022 7:31 PM Kevin Bazan M.D.

## 2022-04-30 NOTE — ANESTHESIA PREPROCEDURE EVALUATION
Case: [id number] Date/Time: 04/29/22 1809    Procedures:       ORIF, HAND      PINNING, FRACTURE, PERCUTANEOUS      INCISION AND DRAINAGE, WOUND, BY ORTHOPEDICS    Location: [place] [address] / SURGERY [facility]    Surgeons: Kevin Bazan M.D.      Ate cheese crackers at 1000 today. Denies medical problems other than hypertension.    Relevant Problems   No relevant active problems       Physical Exam    Airway   Mallampati: II  TM distance: >3 FB  Neck ROM: full       Cardiovascular - normal exam  Rhythm: regular  Rate: normal  (-) murmur     Dental - normal exam           Pulmonary - normal exam  Breath sounds clear to auscultation     Abdominal    Neurological - normal exam                 Anesthesia Plan    ASA 3- EMERGENT   ASA physical status 3 criteria: hypertension - poorly controlledASA physical status emergent criteria: compromised vital organ, limb or tissue    Plan - general       Airway plan will be ETT                    Informed Consent:

## 2022-04-30 NOTE — PROGRESS NOTES
81yoM with circular saw injury to dorsal left hand with open 2nd MC fx and extensor tendon lacerations.  Planning exploration and debridement with possible fixation/repair in OR today.  See full dictated consultation for further details.

## 2022-04-30 NOTE — CARE PLAN
The patient is Stable - Low risk of patient condition declining or worsening    Shift Goals  Clinical Goals: pain control, mobility  Patient Goals: pain control, PT & OT Eval  Family Goals: n/a    Progress made toward(s) clinical / shift goals:      Patient is not progressing towards the following goals:    Problem: Knowledge Deficit - Standard  Goal: Patient and family/care givers will demonstrate understanding of plan of care, disease process/condition, diagnostic tests and medications  Outcome: Progressing    POC discussed with the patient Discharge instructions given. Questions answered. Verbalized understanding.     Problem: Pain - Standard  Goal: Alleviation of pain or a reduction in pain to the patient’s comfort goal  Outcome: Progressing     Educated on pain scale. Encouraged to verbalize pain. Will medicate as per MAR.    Problem: Fall Risk  Goal: Patient will remain free from falls  Outcome: Progressing  Note: Fall protocol in effect. Non skid socks in use. Call light within reach. Reminded patient to call for assist. Hourly rounds in effect. Kept room clutter free.

## 2022-04-30 NOTE — THERAPY
"Occupational Therapy   Initial Evaluation     Patient Name: Alok Pickett  Age:  81 y.o., Sex:  male  Medical Record #: [id number]  Today's Date: 4/30/2022     Precautions: Non Weight Bearing Left Upper Extremity  Comments: sling for comfort    Assessment  Patient is 81 y.o. male admitted after severe hand injury d/t circular saw. Pt is dx w/Complex open laceration to left hand with multiple extensor tendon lacerations, open 1st-4th MC fxs, open 4ths proximal phalanx fx, traumatic arthrotomy 4th MCP joint. Pt is s/p I&D, but per surgeon likely needs additional sx w/hand specialist. At this time pt is NWB and in a short arm plaster splint w/fingers extended. Pt reports good pain control and is aware of NWB status, as well as need to elevate LUE. Pt does have intermittent support at d/c and was able to complete ADL's w/adpative strategies. Pt has no further acute OT needs at this time but will likely need outpt follow up once cleared by MD.    Plan  Recommend Occupational Therapy for Evaluation only    DC Equipment Recommendations: None  Discharge Recommendations: Anticipate that the patient will have no further occupational therapy needs after discharge from the hospital (until cleared my MD)     Subjective  \"I'll be fine I will call my family once I get home\"      Objective     04/30/22 0925   Total Time Spent   Total Time Spent (Mins) 25   Charge Group   OT Evaluation OT Evaluation Low   Initial Contact Note    Initial Contact Note Order Received and Verified, Evaluation Only - Patient Does Not Require Further Acute Occupational Therapy at this Time.  However, May Benefit from Post Acute Therapy for Higher Level Functional Deficits.   Prior Living Situation   Prior Services None   Housing / Facility Motor Home   Steps Into Home 0   Bathroom Set up Walk In Shower   Equipment Owned Front-Wheel Walker;Single Point Cane   Lives with - Patient's Self Care Capacity Alone and Able to Care For Self   Comments pt reports " he lives in his 5th wheel and that family/friends live in the home on the property and can assist as needed   Prior Level of ADL Function   Self Feeding Independent   Grooming / Hygiene Independent   Bathing Independent   Dressing Independent   Toileting Independent   Prior Level of IADL Function   Medication Management Independent   Laundry Independent   Kitchen Mobility Independent   Finances Independent   Home Management Independent   Shopping Independent   Occupation (Pre-Hospital Vocational) Not Employed   Precautions   Precautions Non Weight Bearing Left Upper Extremity   Comments sling for comfort   Pain 0 - 10 Group   Location Hand;Arm   Location Orientation Left   Therapist Pain Assessment During Activity;Nurse Notified;2   Cognition    Cognition / Consciousness WDL   Passive ROM Upper Body   Passive ROM Upper Body X   Comments RUE limited by chronic shoulder injury LUE limited distally by splint   Active ROM Upper Body   Active ROM Upper Body  X   Dominant Hand Right   Comments L limited distally by splint and injury   Strength Upper Body   Upper Body Strength  X   Comments RUE WFL; LUE NWB   Sensation Upper Body   Upper Extremity Sensation  WDL   Comments pt reports having sensation in L fingers   Coordination Upper Body   Coordination X   Fine Motor Coordination LUE hand splinted and w/extensive extensor tendon injuries   Balance Assessment   Sitting Balance (Static) Good   Sitting Balance (Dynamic) Fair +   Standing Balance (Static) Fair +   Standing Balance (Dynamic) Fair +   Weight Shift Sitting Good   Weight Shift Standing Fair   Comments no AD   Bed Mobility    Supine to Sit Supervised   ADL Assessment   Grooming Supervision;Standing   Upper Body Dressing Supervision   Lower Body Dressing Contact Guard Assist   Toileting Supervision   Comments assist for tieing pants/buttons   How much help from another person does the patient currently need...   6 Clicks Daily Activity Score 24   Functional  Mobility   Sit to Stand Supervised   Bed, Chair, Wheelchair Transfer Supervised   Toilet Transfers Supervised   Mobility walking in room no AD   Edema / Skin Assessment   Edema / Skin  Not Assessed   Activity Tolerance   Comments no overt c/o pain or fatigue   Education Group   Role of Occupational Therapist Patient Response Patient;Acceptance;Explanation   Problem List   Problem List None

## 2022-04-30 NOTE — HOSPITAL COURSE
This is a 81 y.o. male  with pmh x of CKD stage 3 presented to ER on 4/29/2022 with left hand laceration with skill saw on 4/29/2022.  Imaging showed Comminuted, displaced fracture of the second metacarpal neck.     Orthopedic surgery Dr Carrero was consulted in ER and patient underwent Exploration of penetrating wound of left hand;  Excisional debridement of left first, second, third and fourth open   metacarpal fractures including skin, subcutaneous tissue, muscle and bone; Excisional debridement of left ring finger proximal phalanx fracture including skin, subcutaneous tissue, muscle and bone; Provisional repair of extensor tendons to the index, middle and ring fingers,;  Repair of complex laceration, left hand, measuring about 15 cm in length By Dr Bazan.    Patient will follow up early next week as an outpatient with either Dr. Bautista or Dr. Palma to discuss the definitive surgical management. He will be nonweightbearing, left upper extremity in his splint with a sling for comfort in the meantime.    Also, he was noted to have WBC of 13.9; likely reactive; pending procal. He is also noted to have macrocytosis; noted to have  Vitamin B12 and  TSH: wnl.     He has CKD stage 3, does follow with Nephrology as an op

## 2022-04-30 NOTE — ED NOTES
Med rec updated/partial per pharmacy on file, Claudia Hammond ([phone]). Unable to interview patient at this time as patient off-floor to surgery. Phone number for daughter in emergency contacts ([phone]) goes directly to voicemail and voicemail has not been set up. Unable to verify whether patient uses any other pharmacy or is on any other medications at this time.

## 2022-04-30 NOTE — PROGRESS NOTES
0650 Patient's sitting up in bed. Bedside report received from LIANG Yeung RN at the beginning of the shift.    0845 Patient's sitting up in bed. Educated on the importance/use of IS at least 10x every hour while awake, able to reach 1500. Fall protocol in effect. Call light within reach 1500. Assessment completed. No distress noted.    0850 Dr Contreras visited. POC discussed with the patient.    0855 COLLINS Rodriguez visited. POC discussed with the patient.    0859 New order received and acknowledged - from dr Contreras - dc'd UA Creatinine and Sodium,  IVF.    0900 BELLA Jimenez worked with the patient.    1020 LIYA Laurent worked with the patient.     1111 Medicated with Oxycodone (see MAR) for c/o's left  Hand/arm pain, rates pain 7/10.    1320 Discharge instructions given to the patient. Questions answered. Patient was discharged with patient's belongings, e prescriptions, arm sling via w/c accompanied by one female CNA and friend via Private car.

## 2022-04-30 NOTE — PROGRESS NOTES
"   Orthopaedic PA Progress Note    Interval changes:Slow recovery from general anes.  Denies hand pain    ROS - Patient denies any new issues. No chest pain, dyspnea, or fever.  Pain well controlled.    /66   Pulse (!) 58   Temp 36.1 °C (97 °F) (Temporal)   Resp 17   Ht 1.727 m (5' 8\")   Wt 88.5 kg (195 lb)   SpO2 94%     Patient seen and examined  No acute distress  Breathing non labored  RRR  Surgical dressing is clean, dry, and intact.  Sensation is intact to light touch throughout median, ulnar, and radial nerve distributions. Strong and palpable radial pulses with capillary refill less than 2 seconds. No arm or hand discomfort.    Recent Labs     04/29/22  1633 04/30/22  0521   WBC 11.2* 13.9*   RBC 4.10* 4.11*   HEMOGLOBIN 13.8* 13.7*   HEMATOCRIT 40.9* 41.5*   MCV 99.8* 101.0*   MCH 33.7* 33.3*   MCHC 33.7 33.0*   RDW 51.0* 51.7*   PLATELETCT 205 210   MPV 9.7 10.3       Active Hospital Problems    Diagnosis    • Laceration of left hand [S61.412A]    • Leucocytosis [D72.829]    • Elevated serum creatinine [R79.89]        Assessment/Plan:  POD#1 S/P I+D,   1.  Exploration of penetrating wound of left hand.  2.  Excisional debridement of left first, second, third and fourth open metacarpal fractures including skin, subcutaneous tissue, muscle and bone.  3.  Excisional debridement of left ring finger proximal phalanx fracture including skin, subcutaneous tissue, muscle and bone.  4.  Provisional repair of extensor tendons to the index, middle and ring fingers.  5.  Repair of complex laceration, left hand, measuring about 15 cm in length.  Wt bearing status - NWB hand, forearm WB ok  PT/OT-initiated  Wound care:Ortho team to manage wound care beneath all splints. Call [phone] if concerns    Drains - no  Means-no  Sutures/Staples out- 10-14 days post operatively  Antibiotics: complete  DVT Prophylaxis- TEDS/SCDs/Foot pumps.   Case Coordination for Discharge Planning - Disposition Home when cleared by Med " and Tx  Follow-Up: needs appointment with Dr. Palma or Dr. Bautista at [facility] at 10-14 days post-op for re-evaluation, staple removal and radiographs.

## 2022-04-30 NOTE — CARE PLAN
The patient is Stable - Low risk of patient condition declining or worsening    Shift Goals  Clinical Goals: pain management, safety, stable vitals, Abx  Patient Goals: pain control, rest    Progress made toward(s) clinical / shift goals:      Problem: Knowledge Deficit - Standard  Goal: Patient and family/care givers will demonstrate understanding of plan of care, disease process/condition, diagnostic tests and medications  Outcome: Progressing     Problem: Pain - Standard  Goal: Alleviation of pain or a reduction in pain to the patient’s comfort goal  Outcome: Progressing       Patient is not progressing towards the following goals:

## 2022-04-30 NOTE — DISCHARGE SUMMARY
Discharge Summary    CHIEF COMPLAINT ON ADMISSION  Chief Complaint   Patient presents with   • T-5000 Lacerations       Reason for Admission  EMS     Admission Date  4/29/2022    CODE STATUS  Full Code    HPI & HOSPITAL COURSE    This is a 81 y.o. male  with pmh x of CKD stage 3 presented to ER on 4/29/2022 with left hand laceration with skill saw on 4/29/2022.  Imaging showed Comminuted, displaced fracture of the second metacarpal neck.     Orthopedic surgery Dr Carrero was consulted in ER and patient underwent Exploration of penetrating wound of left hand;  Excisional debridement of left first, second, third and fourth open metacarpal fractures including skin, subcutaneous tissue, muscle and bone; Excisional debridement of left ring finger proximal phalanx fracture including skin, subcutaneous tissue, muscle and bone; Provisional repair of extensor tendons to the index, middle and ring fingers,;  Repair of complex laceration, left hand, measuring about 15 cm in length By Dr Bazan.    Patient will follow up early next week as an outpatient with either Dr. Bautista or Dr. Palma to discuss the definitive surgical management. He will be nonweightbearing, left upper extremity in his splint with a sling for comfort in the meantime.    Also, he was noted to have WBC of 13.9; likely reactive; pending procal. He is also noted to have macrocytosis; noted to have  Vitamin B12 and  TSH: wnl.     He has CKD stage 3, does follow with [facility] as an [facility] at [facility].    Therefore, he is discharged in fair and stable condition to home with close outpatient follow-up.      Discharge Date  4/30/2022    FOLLOW UP ITEMS POST DISCHARGE  Please follow up with pcp as an op   Patient will follow up early next week as an outpatient with either Dr. Bautista or Dr. Palma to discuss the definitive surgical management.    DISCHARGE DIAGNOSES  Active Problems:    Laceration of left hand POA: Yes    Leucocytosis POA: Unknown    Elevated serum  creatinine POA: Unknown    Hyperglycemia POA: Unknown  Resolved Problems:    * No resolved hospital problems. *      FOLLOW UP  No future appointments.  Stevenson Palma M.D.  [address]  [address]  [place] [id number] [phone]    In 1 week  Call ASAP to schedule fu appt for early next with with Dr. Palma      MEDICATIONS ON DISCHARGE     Medication List      START taking these medications      Instructions   polyethylene glycol/lytes 17 g Pack  Commonly known as: MIRALAX   Take 1 Packet by mouth 1 time a day as needed (if sennosides and docusate ineffective after 24 hours).  Dose: 17 g     senna-docusate 8.6-50 MG Tabs  Commonly known as: PERICOLACE or SENOKOT S   Take 2 Tablets by mouth 2 times a day.  Dose: 2 Tablet        CONTINUE taking these medications      Instructions   HYDROcodone-acetaminophen 7.5-325 MG tab  Commonly known as: NORCO   Take 1 Tablet by mouth every 6 hours as needed for Severe Pain.  Dose: 1 Tablet     nortriptyline 50 MG capsule  Commonly known as: PAMELOR   Take 50 mg by mouth at bedtime.  Dose: 50 mg            Allergies  No Known Allergies    DIET  Orders Placed This Encounter   Procedures   • Diet Order Diet: Regular     Standing Status:   Standing     Number of Occurrences:   1     Order Specific Question:   Diet:     Answer:   Regular [1]       ACTIVITY  As tolerated.  He will be nonweightbearing, left upper extremity in his splint with a   sling for comfort in the meantime.    CONSULTATIONS  ortho    PROCEDURES  1.  Exploration of penetrating wound of left hand.  2.  Excisional debridement of left first, second, third and fourth open   metacarpal fractures including skin, subcutaneous tissue, muscle and bone.  3.  Excisional debridement of left ring finger proximal phalanx fracture   including skin, subcutaneous tissue, muscle and bone.  4.  Provisional repair of extensor tendons to the index, middle and ring   fingers.  5.  Repair of complex laceration,  left hand, measuring about 15 cm in length.         LABORATORY  Lab Results   Component Value Date    SODIUM 138 04/30/2022    POTASSIUM 4.6 04/30/2022    CHLORIDE 103 04/30/2022    CO2 21 04/30/2022    GLUCOSE 179 (H) 04/30/2022    BUN 27 (H) 04/30/2022    CREATININE 1.47 (H) 04/30/2022        Lab Results   Component Value Date    WBC 13.9 (H) 04/30/2022    HEMOGLOBIN 13.7 (L) 04/30/2022    HEMATOCRIT 41.5 (L) 04/30/2022    PLATELETCT 210 04/30/2022        Total time of the discharge process exceeds 35 minutes.

## 2022-04-30 NOTE — PROGRESS NOTES
2035  Report received from Alok Zapata RN.    2103  Pt arrived to unit via gurney and walked to hospital bed from the hallway with 1 person assist. A&O x 4, pain 4/10, ice pack and extra pillows to elevate left hand/arm were provided, on 10L O2, dressing to LUE in place, with all belongings including cell phone and glasses at bedside. Pt oriented to unit, call light and belongings within reach, educated to call for assistance.    4 Eyes Skin Assessment Completed by ALONA Yeung and ALONA Ball.    Head WDL  Ears WDL  Nose WDL  Mouth WDL  Neck WDL  Breast/Chest WDL  Shoulder Blades WDL  Spine WDL, scar  (R) Arm/Elbow/Hand WDL  (L) Arm/Elbow/Hand Swelling, Surgical site  Abdomen WDL  Groin WDL  Scrotum/Coccyx/Buttocks Redness and Blanching  (R) Leg Edema  (L) Leg Edema  (R) Heel/Foot/Toe Swelling, Great toe painful due to Gout  (L) Heel/Foot/Toe WDL          Devices In Places Blood Pressure Cuff, Pulse Ox, SCD's and Oxy Mask      Interventions In Place Pillows and Pressure Redistribution Mattress    Possible Skin Injury No    Pictures Uploaded Into Epic N/A  Wound Consult Placed N/A  RN Wound Prevention Protocol Ordered No

## 2022-04-30 NOTE — ANESTHESIA TIME REPORT
Anesthesia Start and Stop Event Times     Date Time Event    4/29/2022 1822 Ready for Procedure     1826 Anesthesia Start        Responsible Staff  04/29/22    Name Role Begin End    Conrado Anderson M.D. Anesth 1826         Overtime Reason:  no overtime (within assigned shift)    Comments:

## 2022-04-30 NOTE — H&P
Hospital Medicine History & Physical Note    Date of Service  4/29/2022    Primary Care Physician  Pcp Pt States None    Consultants  orthopedics    Specialist Names:     Code Status  Full Code    Chief Complaint  Chief Complaint   Patient presents with   • T-5000 Lacerations       History of Presenting Illness  Alok Pickett is a 81 y.o. male who presented 4/29/2022 with left hand laceration with skill saw earlier today accidentally.  Orthopedic was consulted right away in ER and he will be taken to the OR today.  Empiric IV antibiotic was given.  His pain is much better now after receiving pain medication.  He will be admitted to orthopedic floor after the procedure later today.  X-rays show fracture of second metacarpal neck  .    I discussed the plan of care with patient.    Review of Systems  ROS    Past Medical History  Reviewed and no pertinent past medical history    Surgical History  Reviewed and no pertinent past surgical history    Family History     Family history reviewed with patient. There is no family history that is pertinent to the chief complaint.     Social History   does not have a smoking history on file. He has never used smokeless tobacco. He reports current alcohol use. He reports that he does not use drugs.    Allergies  No Known Allergies    Medications  None       Physical Exam  Temp:  [36.1 °C (97 °F)] 36.1 °C (97 °F)  Pulse:  [] 102  Resp:  [14-22] 22  BP: (165-192)/(76-84) 178/76  SpO2:  [93 %-95 %] 94 %  Blood Pressure : (!) 178/76   Temperature: 36.1 °C (97 °F)   Pulse: (!) 102   Respiration: (!) 22   Pulse Oximetry: 94 %       Physical Exam    Laboratory:  Recent Labs     04/29/22  1633   WBC 11.2*   RBC 4.10*   HEMOGLOBIN 13.8*   HEMATOCRIT 40.9*   MCV 99.8*   MCH 33.7*   MCHC 33.7   RDW 51.0*   PLATELETCT 205   MPV 9.7         No results for input(s): ALTSGPT, ASTSGOT, ALKPHOSPHAT, TBILIRUBIN, DBILIRUBIN, GAMMAGT, AMYLASE, LIPASE, ALB, PREALBUMIN, GLUCOSE in the last 72  hours.      No results for input(s): NTPROBNP in the last 72 hours.      No results for input(s): TROPONINT in the last 72 hours.    Imaging:  DX-CHEST-LIMITED (1 VIEW)   Final Result         No acute cardiac or pulmonary abnormality is identified.      DX-HAND 3+ LEFT   Final Result      Comminuted, displaced fracture of the second metacarpal neck.      Radiopaque foreign bodies.      CT-HAND W/O LEFT    (Results Pending)           Assessment/Plan:  Justification for Admission Status  I anticipate this patient is appropriate for observation status at this time because hand laceration    Laceration of left hand- (present on admission)  Assessment & Plan  With fracture of second metacarpal neck  From saw accident  NPO  Pain control  To OR today      Hyperglycemia  Assessment & Plan  No history of DM  Check A1c    Elevated serum creatinine  Assessment & Plan  Not sure what his baseline Cr level  Follow cmp in am    Leucocytosis  Assessment & Plan  Related to above  Follow cbc in am      VTE prophylaxis: heparin ppx

## 2022-04-30 NOTE — PROGRESS NOTES
81yoM with circular saw injury to dorsal left hand with open 1st-4th MC fxs, open ring finger prox phalanx fx and traumatic 4th MCP arthrotomy and extensor tendon lacerations thumb through ring fingers s/p I&D, provisional repair and wound closure.    S: Doing okay this am    O:  Vitals:    04/30/22 0746   BP: 136/68   Pulse: 77   Resp: 16   Temp: 36.7 °C (98 °F)   SpO2: 94%     Exam:  General-NAD, alert and following commands  LUE-splint c/d/i. Flexing thumb, limited extension, slightly flexion/extending fingers in splint, BCR in fingertips    A: 81yoM with circular saw injury to dorsal left hand with open 1st-4th MC fxs, open ring finger prox phalanx fx and traumatic 4th MCP arthrotomy and extensor tendon lacerations thumb through ring fingers s/p I&D, provisional repair and wound closure.    Recs:  --okay for discharge to home   --fu early next week as outpatient Dr. Palma  --NWB LUE in splint with sling for comfort

## 2022-04-30 NOTE — DISCHARGE INSTRUCTIONS
Amoxicillin; Clavulanic Acid tablets  What is this medicine?  AMOXICILLIN; CLAVULANIC ACID (a mox i ANDREW in; KEELY issac caren ic AS id) is a penicillin antibiotic. It is used to treat certain kinds of bacterial infections. It will not work for colds, flu, or other viral infections.  This medicine may be used for other purposes; ask your health care provider or pharmacist if you have questions.  COMMON BRAND NAME(S): Augmentin  What should I tell my health care provider before I take this medicine?  They need to know if you have any of these conditions:  · bowel disease, like colitis  · kidney disease  · liver disease  · mononucleosis  · an unusual or allergic reaction to amoxicillin, penicillin, cephalosporin, other antibiotics, clavulanic acid, other medicines, foods, dyes, or preservatives  · pregnant or trying to get pregnant  · breast-feeding  How should I use this medicine?  Take this medicine by mouth with a full glass of water. Follow the directions on the prescription label. Take at the start of a meal. Do not crush or chew. If the tablet has a score line, you may cut it in half at the score line for easier swallowing. Take your medicine at regular intervals. Do not take your medicine more often than directed. Take all of your medicine as directed even if you think you are better. Do not skip doses or stop your medicine early.  Talk to your pediatrician regarding the use of this medicine in children. Special care may be needed.  Overdosage: If you think you have taken too much of this medicine contact a poison control center or emergency room at once.  NOTE: This medicine is only for you. Do not share this medicine with others.  What if I miss a dose?  If you miss a dose, take it as soon as you can. If it is almost time for your next dose, take only that dose. Do not take double or extra doses.  What may interact with this medicine?  · allopurinol  · anticoagulants  · birth control  pills  · methotrexate  · probenecid  This list may not describe all possible interactions. Give your health care provider a list of all the medicines, herbs, non-prescription drugs, or dietary supplements you use. Also tell them if you smoke, drink alcohol, or use illegal drugs. Some items may interact with your medicine.  What should I watch for while using this medicine?  Tell your doctor or healthcare provider if your symptoms do not improve.  This medicine may cause serious skin reactions. They can happen weeks to months after starting the medicine. Contact your healthcare provider right away if you notice fevers or flu-like symptoms with a rash. The rash may be red or purple and then turn into blisters or peeling of the skin. Or, you might notice a red rash with swelling of the face, lips or lymph nodes in your neck or under your arms.  Do not treat diarrhea with over the counter products. Contact your doctor if you have diarrhea that lasts more than 2 days or if it is severe and watery.  If you have diabetes, you may get a false-positive result for sugar in your urine. Check with your doctor or healthcare provider.  Birth control pills may not work properly while you are taking this medicine. Talk to your doctor about using an extra method of birth control.  What side effects may I notice from receiving this medicine?  Side effects that you should report to your doctor or health care professional as soon as possible:  · allergic reactions like skin rash, itching or hives, swelling of the face, lips, or tongue  · breathing problems  · dark urine  · fever or chills, sore throat  · redness, blistering, peeling, or loosening of the skin, including inside the mouth  · seizures  · trouble passing urine or change in the amount of urine  · unusual bleeding, bruising  · unusually weak or tired  · white patches or sores in the mouth or throat  Side effects that usually do not require medical attention (report to your  doctor or health care professional if they continue or are bothersome):  · diarrhea  · dizziness  · headache  · nausea, vomiting  · stomach upset  · vaginal or anal irritation  This list may not describe all possible side effects. Call your doctor for medical advice about side effects. You may report side effects to FDA at 3-960-EYE-4606.  Where should I keep my medicine?  Keep out of the reach of children.  Store at room temperature below 25 degrees C (77 degrees F). Keep container tightly closed. Throw away any unused medicine after the expiration date.  NOTE: This sheet is a summary. It may not cover all possible information. If you have questions about this medicine, talk to your doctor, pharmacist, or health care provider.  © 2020 [employer] (2020-03-02 09:43:46)        Oxycodone tablets or capsules  What is this medicine?  OXYCODONE (ox i KOE done) is a pain reliever. It is used to treat moderate to severe pain.  This medicine may be used for other purposes; ask your health care provider or pharmacist if you have questions.  COMMON BRAND NAME(S): Dazidox, Endocodone, Oxaydo, OXECTA, OxyIR, Percolone, Roxicodone, Roxybond  What should I tell my health care provider before I take this medicine?  They need to know if you have any of these conditions:  · [place]'s disease  · brain tumor  · head injury  · heart disease  · history of drug or alcohol abuse problem  · if you often drink alcohol  · kidney disease  · liver disease  · lung or breathing disease, like asthma  · mental illness  · pancreatic disease  · seizures  · thyroid disease  · an unusual or allergic reaction to oxycodone, codeine, hydrocodone, morphine, other medicines, foods, dyes, or preservatives  · pregnant or trying to get pregnant  · breast-feeding  How should I use this medicine?  Take this medicine by mouth with a glass of water. Follow the directions on the prescription label. You can take it with or without food. If it upsets your  stomach, take it with food. Take your medicine at regular intervals. Do not take it more often than directed. Do not stop taking except on your doctor's advice.  Some brands of this medicine, like Oxecta, have special instructions. Ask your doctor or pharmacist if these directions are for you: Do not cut, crush or chew this medicine. Swallow only one tablet at a time. Do not wet, soak, or lick the tablet before you take it.  A special MedGuide will be given to you by the pharmacist with each prescription and refill. Be sure to read this information carefully each time.  Talk to your pediatrician regarding the use of this medicine in children. Special care may be needed.  Overdosage: If you think you have taken too much of this medicine contact a poison control center or emergency room at once.  NOTE: This medicine is only for you. Do not share this medicine with others.  What if I miss a dose?  If you miss a dose, take it as soon as you can. If it is almost time for your next dose, take only that dose. Do not take double or extra doses.  What may interact with this medicine?  This medicine may interact with the following medications:  · alcohol  · antihistamines for allergy, cough and cold  · antiviral medicines for HIV or AIDS  · atropine  · certain antibiotics like clarithromycin, erythromycin, linezolid, rifampin  · certain medicines for anxiety or sleep  · certain medicines for bladder problems like oxybutynin, tolterodine  · certain medicines for depression like amitriptyline, fluoxetine, sertraline  · certain medicines for fungal infections like ketoconazole, itraconazole, voriconazole  · certain medicines for migraine headache like almotriptan, eletriptan, frovatriptan, naratriptan, rizatriptan, sumatriptan, zolmitriptan  · certain medicines for nausea or vomiting like dolasetron, ondansetron, palonosetron  · certain medicines for Parkinson's disease like benztropine, trihexyphenidyl  · certain medicines for  seizures like phenobarbital, phenytoin, primidone  · certain medicines for stomach problems like dicyclomine, hyoscyamine  · certain medicines for travel sickness like scopolamine  · diuretics  · general anesthetics like halothane, isoflurane, methoxyflurane, propofol  · ipratropium  · local anesthetics like lidocaine, pramoxine, tetracaine  · MAOIs like Carbex, Eldepryl, Marplan, Nardil, and Parnate  · medicines that relax muscles for surgery  · methylene blue  · nilotinib  · other narcotic medicines for pain or cough  · phenothiazines like chlorpromazine, mesoridazine, prochlorperazine, thioridazine  This list may not describe all possible interactions. Give your health care provider a list of all the medicines, herbs, non-prescription drugs, or dietary supplements you use. Also tell them if you smoke, drink alcohol, or use illegal drugs. Some items may interact with your medicine.  What should I watch for while using this medicine?  Tell your doctor or health care professional if your pain does not go away, if it gets worse, or if you have new or a different type of pain. You may develop tolerance to the medicine. Tolerance means that you will need a higher dose of the medicine for pain relief. Tolerance is normal and is expected if you take this medicine for a long time.  Do not suddenly stop taking your medicine because you may develop a severe reaction. Your body becomes used to the medicine. This does NOT mean you are addicted. Addiction is a behavior related to getting and using a drug for a non-medical reason. If you have pain, you have a medical reason to take pain medicine. Your doctor will tell you how much medicine to take. If your doctor wants you to stop the medicine, the dose will be slowly lowered over time to avoid any side effects.  There are different types of narcotic medicines (opiates). If you take more than one type at the same time or if you are taking another medicine that also causes  drowsiness, you may have more side effects. Give your health care provider a list of all medicines you use. Your doctor will tell you how much medicine to take. Do not take more medicine than directed. Call emergency for help if you have problems breathing or unusual sleepiness.  You may get drowsy or dizzy. Do not drive, use machinery, or do anything that needs mental alertness until you know how the medicine affects you. Do not stand or sit up quickly, especially if you are an older patient. This reduces the risk of dizzy or fainting spells. Alcohol may interfere with the effect of this medicine. Avoid alcoholic drinks.  This medicine will cause constipation. Try to have a bowel movement at least every 2 to 3 days. If you do not have a bowel movement for 3 days, call your doctor or health care professional.  Your mouth may get dry. Chewing sugarless gum or sucking hard candy, and drinking plenty of water may help. Contact your doctor if the problem does not go away or is severe.  What side effects may I notice from receiving this medicine?  Side effects that you should report to your doctor or health care professional as soon as possible:  · allergic reactions like skin rash, itching or hives, swelling of the face, lips, or tongue  · breathing problems  · confusion  · signs and symptoms of low blood pressure like dizziness; feeling faint or lightheaded, falls; unusually weak or tired  · trouble passing urine or change in the amount of urine  · trouble swallowing  Side effects that usually do not require medical attention (report to your doctor or health care professional if they continue or are bothersome):  · constipation  · dry mouth  · nausea, vomiting  · tiredness  This list may not describe all possible side effects. Call your doctor for medical advice about side effects. You may report side effects to FDA at 0-366-FDA-7592.  Where should I keep my medicine?  Keep out of the reach of children. This medicine  can be abused. Keep your medicine in a safe place to protect it from theft. Do not share this medicine with anyone. Selling or giving away this medicine is dangerous and against the law.  Store at room temperature between 15 and 30 degrees C (59 and 86 degrees F). Protect from light. Keep container tightly closed.  This medicine may cause harm and death if it is taken by other adults, children, or pets. Return medicine that has not been used to an official disposal site. Contact the [facility] at 1-621.168.3955 or your [facility] government to find a site. If you cannot return the medicine, flush it down the toilet. Do not use the medicine after the expiration date.  NOTE: This sheet is a summary. It may not cover all possible information. If you have questions about this medicine, talk to your doctor, pharmacist, or health care provider.  © 2020 [employer] (2018-04-24 16:13:10)          Discharge Instructions    Discharged to home by car with friend. Discharged via wheelchair, hospital escort: Yes.  Special equipment needed: arm sling    Be sure to schedule a follow-up appointment with your primary care doctor or any specialists as instructed.     Discharge Plan:        I understand that a diet low in cholesterol, fat, and sodium is recommended for good health. Unless I have been given specific instructions below for another diet, I accept this instruction as my diet prescription.   Other diet: regular    Special Instructions: see discharge instructions from Dr Contreras below    · Is patient discharged on Warfarin / Coumadin?   No     Depression / Suicide Risk    As you are discharged from this [facility] facility, it is important to learn how to keep safe from harming yourself.    Recognize the warning signs:  · Abrupt changes in personality, positive or negative- including increase in energy   · Giving away possessions  · Change in eating patterns- significant weight changes-  positive or  negative  · Change in sleeping patterns- unable to sleep or sleeping all the time   · Unwillingness or inability to communicate  · Depression  · Unusual sadness, discouragement and loneliness  · Talk of wanting to die  · Neglect of personal appearance   · Rebelliousness- reckless behavior  · Withdrawal from people/activities they love  · Confusion- inability to concentrate     If you or a loved one observes any of these behaviors or has concerns about self-harm, here's what you can do:  · Talk about it- your feelings and reasons for harming yourself  · Remove any means that you might use to hurt yourself (examples: pills, rope, extension cords, firearm)  · Get professional help from the community (Mental Health, Substance Abuse, psychological counseling)  · Do not be alone:Call your Safe Contact- someone whom you trust who will be there for you.  · Call your local CRISIS HOTLINE 562-5751 or 392-149-2195  · Call your local Children's Mobile Crisis Response Team [place] (744) 194-7204 or [employer]  · Call the toll free National Suicide Prevention Hotlines   · National Suicide Prevention Lifeline 263-281-WRHU (7312)  · National Hope Line Network 800-SUICIDE (595-7834)          Incision and Drainage, Care After  This sheet gives you information about how to care for yourself after your procedure. Your health care provider may also give you more specific instructions. If you have problems or questions, contact your health care provider.  What can I expect after the procedure?  After the procedure, it is common to have:  · Pain or discomfort around the incision site.  · Blood, fluid, or pus (drainage) from the incision.  · Redness and firm skin around the incision site.  Follow these instructions at home:  Medicines  · Take over-the-counter and prescription medicines only as told by your health care provider.  · If you were prescribed an antibiotic medicine, use or take it as told by your health care  provider. Do not stop using the antibiotic even if you start to feel better.  Wound care  Follow instructions from your health care provider about how to take care of your wound. Make sure you:  · Wash your hands with soap and water before and after you change your bandage (dressing). If soap and water are not available, use hand .  · Change your dressing and packing as told by your health care provider.  ? If your dressing is dry or stuck when you try to remove it, moisten or wet the dressing with saline or water so that it can be removed without harming your skin or tissues.  ? If your wound is packed, leave it in place until your health care provider tells you to remove it. To remove the packing, moisten or wet the packing with saline or water so that it can be removed without harming your skin or tissues.  · Leave stitches (sutures), skin glue, or adhesive strips in place. These skin closures may need to stay in place for 2 weeks or longer. If adhesive strip edges start to loosen and curl up, you may trim the loose edges. Do not remove adhesive strips completely unless your health care provider tells you to do that.  Check your wound every day for signs of infection. Check for:  · More redness, swelling, or pain.  · More fluid or blood.  · Warmth.  · Pus or a bad smell.  If you were sent home with a drain tube in place, follow instructions from your health care provider about:  · How to empty it.  · How to care for it at home.    General instructions  · Rest the affected area.  · Do not take baths, swim, or use a hot tub until your health care provider approves. Ask your health care provider if you may take showers. You may only be allowed to take sponge baths.  · Return to your normal activities as told by your health care provider. Ask your health care provider what activities are safe for you. Your health care provider may put you on activity or lifting restrictions.  · The incision will continue to  drain. It is normal to have some clear or slightly bloody drainage. The amount of drainage should lessen each day.  · Do not apply any creams, ointments, or liquids unless you have been told to by your health care provider.  · Keep all follow-up visits as told by your health care provider. This is important.  Contact a health care provider if:  · Your cyst or abscess returns.  · You have a fever or chills.  · You have more redness, swelling, or pain around your incision.  · You have more fluid or blood coming from your incision.  · Your incision feels warm to the touch.  · You have pus or a bad smell coming from your incision.  · You have red streaks above or below the incision site.  Get help right away if:  · You have severe pain or bleeding.  · You cannot eat or drink without vomiting.  · You have decreased urine output.  · You become short of breath.  · You have chest pain.  · You cough up blood.  · The affected area becomes numb or starts to tingle.  These symptoms may represent a serious problem that is an emergency. Do not wait to see if the symptoms will go away. Get medical help right away. Call your local emergency services (911 in the [place] Do not drive yourself to the hospital.  Summary  · After this procedure, it is common to have fluid, blood, or pus coming from the surgery site.  · Follow all home care instructions. You will be told how to take care of your incision, how to check for infection, and how to take medicines.  · If you were prescribed an antibiotic medicine, take it as told by your health care provider. Do not stop taking the antibiotic even if you start to feel better.  · Contact a health care provider if you have increased redness, swelling, or pain around your incision. Get help right away if you have chest pain, you vomit, you cough up blood, or you have shortness of breath.  · Keep all follow-up visits as told by your health care provider. This is important.  This information is not  intended to replace advice given to you by your health care provider. Make sure you discuss any questions you have with your health care provider.  Document Released: 03/11/2013 Document Revised: 11/18/2019 Document Reviewed: 11/18/2019  [employer] Patient Education © 2020 [employer]      Discharge Instructions per Evelia Contreras M.D.  DIET: Resume previous diet  Healthy diet. Plenty of vegetables.  ACTIVITY: Avoid strenuous activity or heavy lifting.   DIAGNOSIS:   Patient Active Problem List   Diagnosis   • Laceration of left hand   • Leucocytosis   • Elevated serum creatinine   • Hyperglycemia     Return to ER in the event of new or worsening symptoms. Please note importance of compliance and the patient has agreed to proceed with all medical recommendations and follow up plan indicated above. All medications come with benefits and risks. Risks may include permanent injury or death and these risks can be minimized with close reassessment and monitoring.     He should follow up early next week as an outpatient with either Dr. Bautista or Dr. Palma to discuss the definitive surgical management.  4.  He will be nonweightbearing, left upper extremity in his splint with a   sling for comfort in the meantime.

## 2022-04-30 NOTE — OP REPORT
DATE OF SERVICE:  04/29/2022     PREOPERATIVE DIAGNOSES:    1.  Complex left dorsal hand laceration with multiple extensor tendon   lacerations.  2.  Left open second metacarpal fracture.     POSTOPERATIVE DIAGNOSES:  1.  Complex open laceration, left hand.  2.  Extensor tendon lacerations involving the ring, middle, index and thumb.  3.  Open fractures of left first, second, third and fourth metacarpals.  4.  Open fracture of left ring finger, proximal phalanx.  5.  Left fourth metacarpophalangeal joint traumatic arthrotomy.     PROCEDURES PERFORMED:  1.  Exploration of penetrating wound of left hand.  2.  Excisional debridement of left first, second, third and fourth open   metacarpal fractures including skin, subcutaneous tissue, muscle and bone.  3.  Excisional debridement of left ring finger proximal phalanx fracture   including skin, subcutaneous tissue, muscle and bone.  4.  Provisional repair of extensor tendons to the index, middle and ring   fingers.  5.  Repair of complex laceration, left hand, measuring about 15 cm in length.     SURGEON:  Kevin Bazan MD     ANESTHESIOLOGIST:  Conrado Anderson MD     ANESTHESIA:  General.     ESTIMATED BLOOD LOSS:  Minimal.     TOURNIQUET TIME:  42 minutes at 250 mmHg to the left arm.     INDICATIONS FOR PROCEDURE:  The patient is a 81-year-old male who sustained a   complex laceration of the dorsum of his hand with a circular saw.  He   presented to [facility] Emergency Department and was found to have open metacarpal   fracture involving the second and a significant soft tissue laceration in 2   separate areas of the dorsum of the hand with extensor tendon lacerations.  He   had a regional nerve block performed in the emergency department for   analgesia and he was given Ancef.  I was consulted to provide treatment   recommendations.  He was admitted to the hospitalist service for observation   and antibiotic therapy.  I recommended going to the operating room for    exploration of his wound, debridement and other indicated procedures.  He   signed informed consent preoperatively and wished to proceed with surgery as   outlined above.     DESCRIPTION OF PROCEDURE:  The patient was met in the preoperative holding   area.  His surgical site was signed.  His consent was confirmed to be   accurate.  He was taken back to the operating room and general anesthesia was   induced.  The left upper extremity was prepped and draped in the usual sterile   fashion.  During the prepping process, he did start to bleed somewhat briskly   from the dorsum of the hand, so the tourniquet was then inflated to obtain   hemostasis throughout the prepping of the extremity.  A formal timeout was   then performed to confirm patient's correct name, correct surgical site,   correct procedure and correct laterality.  I ligated some lacerated dorsal   subcutaneous veins with punctate Bovie cautery.  I explored the wound.  There   Were 2 oblique lacerations extending from the first webspace dorsally over to   about the fourth metacarpophalangeal joint area with a 2-3 cm skin bridge   between the 2 lacerations.  There was some particulate debris and fabric   within the wound, which I excisionally debrided with rongeur.  A thorough   exploration of the penetrating wound was performed and it was noted that he   had a comminuted distal third, second metacarpal shaft fracture and I excised   some of the bone of the open fracture including skin, subcutaneous tissue,   muscle and bone with a rongeur and a 15-blade scalpel for some devitalized   tissue.  It was noted on further exploration that he had a cortical defect of   the fracture, which was nondisplaced of the first metacarpal, third metacarpal   and fourth metacarpal distally as well as a traumatic arthrotomy to the   fourth metacarpophalangeal joint and a fracture of the base of the ring finger   proximal phalanx through the traumatic arthrotomy as well as  a laceration to   the extensor mechanism of the ring, middle, index and thumb, specifically the   EPL.  I was able to identify the retracted tendons proximally. With the   exception, I was unable to find the retracted stump of the EPL tendon.  I   excisionally debrided particulate debris that I found within the dorsal first   webspace with a rongeur, I then thoroughly irrigated the wound after   sufficient excisional debridement of the open fractures in the traumatic wound   was performed with a Pulsavac using normal saline.  I then reapproximated the   dorsal fascia over the webspace musculature with interrupted 2-0 Vicryl.  I   loosely reapproximated the extensor tendons just to prevent proximal   retraction, with 2-0 Vicryl with the exception of the EPL tendon, which I   cannot locate the proximal stump and I then loosely reapproximated the skin   edges with 3-0 nylon.  I then applied Xeroform and an absorptive dressing and   a well-padded short arm plaster splint with the fingers extended.  The   tourniquet was deflated after 42 minutes.  He was awoken from anesthesia and   transferred on the [facility] and taken to postanesthesia care unit in stable   condition.     PLAN:    1.  The patient will be admitted for observation and antibiotics overnight for   infection prophylaxis.  2.  Anticipated discharge home tomorrow.  3.  He should follow up early next week as an outpatient with either Dr. Bautista or Dr. Palma to discuss the definitive surgical management.  4.  He will be nonweightbearing, left upper extremity in his splint with a   sling for comfort in the meantime.        ______________________________  MD VEL Man/YAMILET/ALBINA    DD:  04/29/2022 19:56  DT:  04/29/2022 21:30    Job#:  [id number]

## 2022-04-30 NOTE — ANESTHESIA PROCEDURE NOTES
Airway    Date/Time: 4/29/2022 6:32 PM  Performed by: Conrado Anderson M.D.  Authorized by: Conrado Anderson M.D.     Location:  OR  Urgency:  Elective  Indications for Airway Management:  Anesthesia      Spontaneous Ventilation: absent    Sedation Level:  Deep  Preoxygenated: Yes    Patient Position:  Sniffing  Final Airway Type:  Endotracheal airway  Final Endotracheal Airway:  ETT  Cuffed: Yes    Technique Used for Successful ETT Placement:  Direct laryngoscopy    Insertion Site:  Oral  Blade Type:  Gilbert  Laryngoscope Blade/Videolaryngoscope Blade Size:  2  ETT Size (mm):  7.5  Measured from:  Teeth  ETT to Teeth (cm):  21  Placement Verified by: auscultation and capnometry    Cormack-Lehane Classification:  Grade IIa - partial view of glottis  Number of Attempts at Approach:  1  Ventilation Between Attempts:  None  Number of Other Approaches Attempted:  0

## 2022-04-30 NOTE — OR NURSING
1941 Pt from OR, asleep, with O2 support of 6 L/min via mask, respiration regular and spontaneous, vital signs within ilsa limits. Pt left hand with dressing, dressing CDI, elevated using 1 pillow. Left hand finger pink in color, warm to touch, brisk cap refill noted.       1947 Pt awake, denies pain and nausea.    1950 Pt's belongings (2 bags) at the [facility].    2000 Pt comfortably resting in the [facility]. O2 support shifted to 10 L/min via oxymask per ERAS protocol.    2005 Pt told this RN that there's no need to update family at this time.    2035 Report given to Anjana SAGE.    2052 Pt A&Ox4, VSS, left arm dressing CDI. To room with transport and O2 support of 10 L/min via oxymask. Two bags of belongings and Eyeglasses at the [facility], pt and transport aware.

## 2022-04-30 NOTE — THERAPY
"Physical Therapy   Initial Evaluation     Patient Name: Alok Pickett  Age:  81 y.o., Sex:  male  Medical Record #: [id number]  Today's Date: 4/30/2022     Precautions  Precautions: (P) Non Weight Bearing Left Upper Extremity  Comments: sling for comfort    Assessment    Patient is 81 y.o. male with admitting diagnosis of hand laceration from circular saw incident involving extensor tendons and metacarpal fractures. Prior to admission pt lived in 5th wheel with 5 steps to enter, independent with self care/ADLs and mobility, ambulation without AD.  Pt's family lives in home on same property, is able to assist as needed.     Pt demonstrates bed mobility with supervision, sit to stand with SBA, ambulation in hallway without AD with SBA, up/down 5 steps with use of single railing SBA. Pt does not require further acute PT services at this time, is appropriate for DC home with family support when medically cleared. No DME needs at this time.      Plan    Recommend Physical Therapy for Evaluation only  DC Equipment Recommendations: (P) None  Discharge Recommendations: (P) Anticipate that the patient will have no further physical therapy needs after discharge from the hospital       Subjective    \"I'm ready to get up.\"       04/30/22 1034   Initial Contact Note    Initial Contact Note Order Received and Verified, Evaluation Only - Patient Does Not Require Further Acute Physical Therapy at this Time.  However, May Benefit from Post Acute Therapy for Higher Level Functional Deficits.   Precautions   Precautions Non Weight Bearing Left Upper Extremity   Vitals   Room Air Oximetry 92   O2 Delivery Device None - Room Air   Pain 0 - 10 Group   Location Arm;Hand   Location Orientation Left   Therapist Pain Assessment Post Activity Pain Same as Prior to Activity   Prior Living Situation   Prior Services Home-Independent   Housing / Facility Motor Home  (5th wheel)   Steps Into Home 5   Steps In Home 0   Rail Left Rail (Steps in " Home)   Bathroom Set up Walk In Shower   Equipment Owned Single Point Cane;Front-Wheel Walker   Lives with - Patient's Self Care Capacity Alone and Able to Care For Self   Prior Level of Functional Mobility   Bed Mobility Independent   Transfer Status Independent   Ambulation Independent   Assistive Devices Used None   Stairs Independent   History of Falls   History of Falls No   Cognition    Cognition / Consciousness WDL   Comments pleasant and cooperative   Strength Lower Body   Lower Body Strength  WDL   Strength Upper Body   Upper Body Strength  X   Comments L UE limited due to pain   Balance Assessment   Sitting Balance (Static) Fair +   Sitting Balance (Dynamic) Fair +   Standing Balance (Static) Fair   Standing Balance (Dynamic) Fair   Weight Shift Sitting Good   Weight Shift Standing Good   Gait Analysis   Gait Level Of Assist Standby Assist   Assistive Device None   Distance (Feet) 500   # of Times Distance was Traveled 1   Deviation No deviation   # of Stairs Climbed 5   Level of Assist with Stairs Standby Assist   Bed Mobility    Supine to Sit Supervised   Sit to Supine Standby Assist   Scooting Standby Assist   Functional Mobility   Sit to Stand Standby Assist   Bed, Chair, Wheelchair Transfer Standby Assist   Mobility bed mobility, sit to stand, ambulation and stairs in hallway without AD   How much difficulty does the patient currently have...   Turning over in bed (including adjusting bedclothes, sheets and blankets)? 4   Sitting down on and standing up from a chair with arms (e.g., wheelchair, bedside commode, etc.) 4   Moving from lying on back to sitting on the side of the bed? 4   How much help from another person does the patient currently need...   Moving to and from a bed to a chair (including a wheelchair)? 4   Need to walk in a hospital room? 4   Climbing 3-5 steps with a railing? 3   6 clicks Mobility Score 23   Activity Tolerance   Sitting in Chair 5 min + left in chair   Sitting Edge of  Bed 3 min   Standing 8 min   Education Group   Education Provided Role of Physical Therapist;Gait Training   Role of Physical Therapist Patient Response Patient;Acceptance;Explanation;Verbal Demonstration   Gait Training Patient Response Patient;Acceptance;Explanation;Verbal Demonstration;Action Demonstration   Anticipated Discharge Equipment and Recommendations   DC Equipment Recommendations None   Discharge Recommendations Anticipate that the patient will have no further physical therapy needs after discharge from the hospital     Anastasiia Marcelino DPT

## 2022-05-11 ENCOUNTER — ANESTHESIA (OUTPATIENT)
Dept: SURGERY | Facility: MEDICAL CENTER | Age: 82
End: 2022-05-11
Payer: COMMERCIAL

## 2022-05-11 ENCOUNTER — ANESTHESIA EVENT (OUTPATIENT)
Dept: SURGERY | Facility: MEDICAL CENTER | Age: 82
End: 2022-05-11
Payer: COMMERCIAL

## 2022-05-11 ENCOUNTER — HOSPITAL ENCOUNTER (OUTPATIENT)
Facility: MEDICAL CENTER | Age: 82
End: 2022-05-11
Attending: ORTHOPAEDIC SURGERY | Admitting: ORTHOPAEDIC SURGERY
Payer: COMMERCIAL

## 2022-05-11 ENCOUNTER — APPOINTMENT (OUTPATIENT)
Dept: RADIOLOGY | Facility: MEDICAL CENTER | Age: 82
End: 2022-05-11
Attending: ORTHOPAEDIC SURGERY
Payer: COMMERCIAL

## 2022-05-11 VITALS
HEIGHT: 68 IN | SYSTOLIC BLOOD PRESSURE: 149 MMHG | WEIGHT: 195.11 LBS | RESPIRATION RATE: 16 BRPM | OXYGEN SATURATION: 94 % | HEART RATE: 77 BPM | DIASTOLIC BLOOD PRESSURE: 69 MMHG | BODY MASS INDEX: 29.57 KG/M2 | TEMPERATURE: 97.5 F

## 2022-05-11 DIAGNOSIS — S61.412A LACERATION OF LEFT HAND WITHOUT FOREIGN BODY, INITIAL ENCOUNTER: ICD-10-CM

## 2022-05-11 LAB
ANION GAP SERPL CALC-SCNC: 14 MMOL/L (ref 7–16)
BUN SERPL-MCNC: 32 MG/DL (ref 8–22)
CALCIUM SERPL-MCNC: 9.7 MG/DL (ref 8.4–10.2)
CHLORIDE SERPL-SCNC: 103 MMOL/L (ref 96–112)
CO2 SERPL-SCNC: 21 MMOL/L (ref 20–33)
CREAT SERPL-MCNC: 1.45 MG/DL (ref 0.5–1.4)
EKG IMPRESSION: NORMAL
GFR SERPLBLD CREATININE-BSD FMLA CKD-EPI: 49 ML/MIN/1.73 M 2
GLUCOSE SERPL-MCNC: 133 MG/DL (ref 65–99)
POTASSIUM SERPL-SCNC: 4.2 MMOL/L (ref 3.6–5.5)
SODIUM SERPL-SCNC: 138 MMOL/L (ref 135–145)

## 2022-05-11 PROCEDURE — C1713 ANCHOR/SCREW BN/BN,TIS/BN: HCPCS | Performed by: ORTHOPAEDIC SURGERY

## 2022-05-11 PROCEDURE — 160035 HCHG PACU - 1ST 60 MINS PHASE I: Performed by: ORTHOPAEDIC SURGERY

## 2022-05-11 PROCEDURE — 93005 ELECTROCARDIOGRAM TRACING: CPT | Performed by: ORTHOPAEDIC SURGERY

## 2022-05-11 PROCEDURE — 80048 BASIC METABOLIC PNL TOTAL CA: CPT

## 2022-05-11 PROCEDURE — 500562 HCHG FIBERWIRE: Performed by: ORTHOPAEDIC SURGERY

## 2022-05-11 PROCEDURE — 01830 ANES ARTHR/NDSC WRST/HND NOS: CPT | Performed by: ANESTHESIOLOGY

## 2022-05-11 PROCEDURE — 160009 HCHG ANES TIME/MIN: Performed by: ORTHOPAEDIC SURGERY

## 2022-05-11 PROCEDURE — 700101 HCHG RX REV CODE 250: Performed by: ANESTHESIOLOGY

## 2022-05-11 PROCEDURE — 502240 HCHG MISC OR SUPPLY RC 0272: Performed by: ORTHOPAEDIC SURGERY

## 2022-05-11 PROCEDURE — 36415 COLL VENOUS BLD VENIPUNCTURE: CPT

## 2022-05-11 PROCEDURE — 160039 HCHG SURGERY MINUTES - EA ADDL 1 MIN LEVEL 3: Performed by: ORTHOPAEDIC SURGERY

## 2022-05-11 PROCEDURE — 700101 HCHG RX REV CODE 250: Performed by: ORTHOPAEDIC SURGERY

## 2022-05-11 PROCEDURE — A9270 NON-COVERED ITEM OR SERVICE: HCPCS | Performed by: ANESTHESIOLOGY

## 2022-05-11 PROCEDURE — 93010 ELECTROCARDIOGRAM REPORT: CPT | Performed by: INTERNAL MEDICINE

## 2022-05-11 PROCEDURE — 700111 HCHG RX REV CODE 636 W/ 250 OVERRIDE (IP)

## 2022-05-11 PROCEDURE — 501838 HCHG SUTURE GENERAL: Performed by: ORTHOPAEDIC SURGERY

## 2022-05-11 PROCEDURE — 700111 HCHG RX REV CODE 636 W/ 250 OVERRIDE (IP): Performed by: ANESTHESIOLOGY

## 2022-05-11 PROCEDURE — 160048 HCHG OR STATISTICAL LEVEL 1-5: Performed by: ORTHOPAEDIC SURGERY

## 2022-05-11 PROCEDURE — 160036 HCHG PACU - EA ADDL 30 MINS PHASE I: Performed by: ORTHOPAEDIC SURGERY

## 2022-05-11 PROCEDURE — 160028 HCHG SURGERY MINUTES - 1ST 30 MINS LEVEL 3: Performed by: ORTHOPAEDIC SURGERY

## 2022-05-11 PROCEDURE — A4565 SLINGS: HCPCS | Performed by: ORTHOPAEDIC SURGERY

## 2022-05-11 PROCEDURE — 502576 HCHG PACK, HAND: Performed by: ORTHOPAEDIC SURGERY

## 2022-05-11 PROCEDURE — 160046 HCHG PACU - 1ST 60 MINS PHASE II: Performed by: ORTHOPAEDIC SURGERY

## 2022-05-11 PROCEDURE — 99100 ANES PT EXTEME AGE<1 YR&>70: CPT | Performed by: ANESTHESIOLOGY

## 2022-05-11 PROCEDURE — 700105 HCHG RX REV CODE 258: Performed by: ORTHOPAEDIC SURGERY

## 2022-05-11 PROCEDURE — 700102 HCHG RX REV CODE 250 W/ 637 OVERRIDE(OP): Performed by: ANESTHESIOLOGY

## 2022-05-11 PROCEDURE — 502000 HCHG MISC OR IMPLANTS RC 0278: Performed by: ORTHOPAEDIC SURGERY

## 2022-05-11 PROCEDURE — 160002 HCHG RECOVERY MINUTES (STAT): Performed by: ORTHOPAEDIC SURGERY

## 2022-05-11 PROCEDURE — 160025 RECOVERY II MINUTES (STATS): Performed by: ORTHOPAEDIC SURGERY

## 2022-05-11 DEVICE — IMPLANTABLE DEVICE: Type: IMPLANTABLE DEVICE | Site: INDEX FINGER | Status: FUNCTIONAL

## 2022-05-11 RX ORDER — ONDANSETRON 2 MG/ML
INJECTION INTRAMUSCULAR; INTRAVENOUS PRN
Status: DISCONTINUED | OUTPATIENT
Start: 2022-05-11 | End: 2022-05-13 | Stop reason: SURG

## 2022-05-11 RX ORDER — OXYCODONE HCL 5 MG/5 ML
10 SOLUTION, ORAL ORAL
Status: COMPLETED | OUTPATIENT
Start: 2022-05-11 | End: 2022-05-11

## 2022-05-11 RX ORDER — HYDROMORPHONE HYDROCHLORIDE 1 MG/ML
0.2 INJECTION, SOLUTION INTRAMUSCULAR; INTRAVENOUS; SUBCUTANEOUS
Status: DISCONTINUED | OUTPATIENT
Start: 2022-05-11 | End: 2022-05-11 | Stop reason: HOSPADM

## 2022-05-11 RX ORDER — HYDRALAZINE HYDROCHLORIDE 20 MG/ML
5 INJECTION INTRAMUSCULAR; INTRAVENOUS
Status: DISCONTINUED | OUTPATIENT
Start: 2022-05-11 | End: 2022-05-11 | Stop reason: HOSPADM

## 2022-05-11 RX ORDER — CHOLECALCIFEROL (VITAMIN D3) 125 MCG
500 CAPSULE ORAL DAILY
COMMUNITY

## 2022-05-11 RX ORDER — LIDOCAINE HYDROCHLORIDE AND EPINEPHRINE 10; 10 MG/ML; UG/ML
INJECTION, SOLUTION INFILTRATION; PERINEURAL
Status: DISCONTINUED | OUTPATIENT
Start: 2022-05-11 | End: 2022-05-11 | Stop reason: HOSPADM

## 2022-05-11 RX ORDER — ONDANSETRON 2 MG/ML
4 INJECTION INTRAMUSCULAR; INTRAVENOUS
Status: DISCONTINUED | OUTPATIENT
Start: 2022-05-11 | End: 2022-05-11 | Stop reason: HOSPADM

## 2022-05-11 RX ORDER — HYDROMORPHONE HYDROCHLORIDE 1 MG/ML
0.4 INJECTION, SOLUTION INTRAMUSCULAR; INTRAVENOUS; SUBCUTANEOUS
Status: DISCONTINUED | OUTPATIENT
Start: 2022-05-11 | End: 2022-05-11 | Stop reason: HOSPADM

## 2022-05-11 RX ORDER — DEXAMETHASONE SODIUM PHOSPHATE 4 MG/ML
INJECTION, SOLUTION INTRA-ARTICULAR; INTRALESIONAL; INTRAMUSCULAR; INTRAVENOUS; SOFT TISSUE PRN
Status: DISCONTINUED | OUTPATIENT
Start: 2022-05-11 | End: 2022-05-13 | Stop reason: SURG

## 2022-05-11 RX ORDER — DIPHENHYDRAMINE HYDROCHLORIDE 50 MG/ML
12.5 INJECTION INTRAMUSCULAR; INTRAVENOUS
Status: DISCONTINUED | OUTPATIENT
Start: 2022-05-11 | End: 2022-05-11 | Stop reason: HOSPADM

## 2022-05-11 RX ORDER — SODIUM CHLORIDE, SODIUM LACTATE, POTASSIUM CHLORIDE, CALCIUM CHLORIDE 600; 310; 30; 20 MG/100ML; MG/100ML; MG/100ML; MG/100ML
INJECTION, SOLUTION INTRAVENOUS CONTINUOUS
Status: DISCONTINUED | OUTPATIENT
Start: 2022-05-11 | End: 2022-05-11 | Stop reason: HOSPADM

## 2022-05-11 RX ORDER — BUPIVACAINE HYDROCHLORIDE AND EPINEPHRINE 5; 5 MG/ML; UG/ML
INJECTION, SOLUTION EPIDURAL; INTRACAUDAL; PERINEURAL
Status: DISCONTINUED | OUTPATIENT
Start: 2022-05-11 | End: 2022-05-11 | Stop reason: HOSPADM

## 2022-05-11 RX ORDER — LIDOCAINE HYDROCHLORIDE 20 MG/ML
INJECTION, SOLUTION EPIDURAL; INFILTRATION; INTRACAUDAL; PERINEURAL PRN
Status: DISCONTINUED | OUTPATIENT
Start: 2022-05-11 | End: 2022-05-13 | Stop reason: SURG

## 2022-05-11 RX ORDER — OXYCODONE HCL 5 MG/5 ML
5 SOLUTION, ORAL ORAL
Status: COMPLETED | OUTPATIENT
Start: 2022-05-11 | End: 2022-05-11

## 2022-05-11 RX ORDER — LIDOCAINE HYDROCHLORIDE 10 MG/ML
INJECTION, SOLUTION EPIDURAL; INFILTRATION; INTRACAUDAL; PERINEURAL
Status: COMPLETED
Start: 2022-05-11 | End: 2022-05-11

## 2022-05-11 RX ORDER — HYDROMORPHONE HYDROCHLORIDE 1 MG/ML
0.1 INJECTION, SOLUTION INTRAMUSCULAR; INTRAVENOUS; SUBCUTANEOUS
Status: DISCONTINUED | OUTPATIENT
Start: 2022-05-11 | End: 2022-05-11 | Stop reason: HOSPADM

## 2022-05-11 RX ORDER — LABETALOL HYDROCHLORIDE 5 MG/ML
INJECTION, SOLUTION INTRAVENOUS PRN
Status: DISCONTINUED | OUTPATIENT
Start: 2022-05-11 | End: 2022-05-13 | Stop reason: SURG

## 2022-05-11 RX ORDER — HALOPERIDOL 5 MG/ML
1 INJECTION INTRAMUSCULAR
Status: DISCONTINUED | OUTPATIENT
Start: 2022-05-11 | End: 2022-05-11 | Stop reason: HOSPADM

## 2022-05-11 RX ORDER — CEFAZOLIN SODIUM 1 G/3ML
INJECTION, POWDER, FOR SOLUTION INTRAMUSCULAR; INTRAVENOUS PRN
Status: DISCONTINUED | OUTPATIENT
Start: 2022-05-11 | End: 2022-05-13 | Stop reason: SURG

## 2022-05-11 RX ADMIN — LIDOCAINE HYDROCHLORIDE 100 MG: 20 INJECTION, SOLUTION EPIDURAL; INFILTRATION; INTRACAUDAL; PERINEURAL at 16:16

## 2022-05-11 RX ADMIN — Medication 0.5 ML: at 15:23

## 2022-05-11 RX ADMIN — ONDANSETRON 4 MG: 2 INJECTION INTRAMUSCULAR; INTRAVENOUS at 16:16

## 2022-05-11 RX ADMIN — FENTANYL CITRATE 100 MCG: 50 INJECTION, SOLUTION INTRAMUSCULAR; INTRAVENOUS at 16:16

## 2022-05-11 RX ADMIN — FENTANYL CITRATE 50 MCG: 50 INJECTION, SOLUTION INTRAMUSCULAR; INTRAVENOUS at 17:51

## 2022-05-11 RX ADMIN — SODIUM CHLORIDE, POTASSIUM CHLORIDE, SODIUM LACTATE AND CALCIUM CHLORIDE: 600; 310; 30; 20 INJECTION, SOLUTION INTRAVENOUS at 16:13

## 2022-05-11 RX ADMIN — LIDOCAINE HYDROCHLORIDE 0.5 ML: 10 INJECTION, SOLUTION EPIDURAL; INFILTRATION; INTRACAUDAL; PERINEURAL at 15:23

## 2022-05-11 RX ADMIN — LABETALOL HYDROCHLORIDE 10 MG: 5 INJECTION, SOLUTION INTRAVENOUS at 16:59

## 2022-05-11 RX ADMIN — DEXAMETHASONE SODIUM PHOSPHATE 4 MG: 4 INJECTION, SOLUTION INTRAMUSCULAR; INTRAVENOUS at 16:16

## 2022-05-11 RX ADMIN — EPHEDRINE SULFATE 10 MG: 50 INJECTION INTRAMUSCULAR; INTRAVENOUS; SUBCUTANEOUS at 16:27

## 2022-05-11 RX ADMIN — PROPOFOL 100 MG: 10 INJECTION, EMULSION INTRAVENOUS at 16:16

## 2022-05-11 RX ADMIN — CEFAZOLIN 2 G: 330 INJECTION, POWDER, FOR SOLUTION INTRAMUSCULAR; INTRAVENOUS at 16:20

## 2022-05-11 RX ADMIN — OXYCODONE HYDROCHLORIDE 10 MG: 5 SOLUTION ORAL at 18:14

## 2022-05-11 RX ADMIN — GLYCOPYRROLATE 0.2 MG: 0.2 INJECTION, SOLUTION INTRAMUSCULAR; INTRAVENOUS at 17:11

## 2022-05-11 RX ADMIN — SODIUM CHLORIDE, POTASSIUM CHLORIDE, SODIUM LACTATE AND CALCIUM CHLORIDE: 600; 310; 30; 20 INJECTION, SOLUTION INTRAVENOUS at 15:24

## 2022-05-11 ASSESSMENT — PAIN DESCRIPTION - PAIN TYPE
TYPE: SURGICAL PAIN
TYPE: SURGICAL PAIN

## 2022-05-11 ASSESSMENT — FIBROSIS 4 INDEX: FIB4 SCORE: 1.55

## 2022-05-11 NOTE — ANESTHESIA PREPROCEDURE EVALUATION
Case: [id number] Date/Time: 05/11/22 9809    Procedures:       ORIF, FINGER - INDEX, METACARPAL (Left )      INCISION AND DRAINAGE, HAND      EXPLORATION, WOUND      REPAIR, TENDON, EXTENSOR - VERSUS      EXCISION, TRAPEZIUM, WITH LIGAMENT RECONSTRUCTION AND TENDON INTERPOSITION - EXTENSOR TENDON RECONSTRUCTION    Pre-op diagnosis: FRACTURE OF METACARPAL BONE- LEFT    Location: [facility] OR 02 / SURGERY [facility]    Surgeons: Stevenson Palma M.D.      80yo with Pmhx of HNT, bronchitis, back pain s/p surgery    NKDA  NPO  No AC      Relevant Problems   No relevant active problems       Physical Exam    Airway   Mallampati: II    Comments: +beard   Cardiovascular - normal exam     Dental - normal exam           Pulmonary    Abdominal - normal exam     Neurological - normal exam                 Anesthesia Plan    ASA 2       Plan - general       Airway plan will be LMA          Induction: intravenous    Postoperative Plan: Postoperative administration of opioids is intended.    Pertinent diagnostic labs and testing reviewed    Informed Consent:    Anesthetic plan and risks discussed with patient.

## 2022-05-11 NOTE — ANESTHESIA PROCEDURE NOTES
Airway    Date/Time: 5/11/2022 4:17 PM  Performed by: Sherie Denise M.D.  Authorized by: Sherie Denise M.D.     Location:  OR  Urgency:  Elective  Indications for Airway Management:  Anesthesia      Spontaneous Ventilation: absent    Sedation Level:  Deep  Preoxygenated: Yes    Patient Position:  Sniffing  Mask Difficulty Assessment:  1 - vent by mask  Final Airway Type:  Supraglottic airway  Final Supraglottic Airway:  Standard LMA    SGA Size:  4  Number of Attempts at Approach:  1

## 2022-05-12 NOTE — OR NURSING
1846 Patient to phase 2 from PACU after report received      1855 Patient dressed with help from CNA    1905 waiting for patients daughter to arrive      1930 Patient education completed, family denies further questions.    1940  DC'd to care of family post uneventful stay in PACU 2.

## 2022-05-12 NOTE — DISCHARGE INSTRUCTIONS
ACTIVITY: Rest and take it easy for the first 24 hours.  A responsible adult is recommended to remain with you during that time.  It is normal to feel sleepy.  We encourage you to not do anything that requires balance, judgment or coordination.    MILD FLU-LIKE SYMPTOMS ARE NORMAL. YOU MAY EXPERIENCE GENERALIZED MUSCLE ACHES, THROAT IRRITATION, HEADACHE AND/OR SOME NAUSEA.    FOR 24 HOURS DO NOT:  Drive, operate machinery or run household appliances.  Drink beer or alcoholic beverages.   Make important decisions or sign legal documents.    SPECIAL INSTRUCTIONS:  1 pound lifting restriction of the left upper extremity. apply ice and elevate as much possible for the next 7 to 10 days.  He will return to clinic at that time for repeat evaluation and x-rays.  He was prescribed narcotic pain medication and instructed not to drive or operate machinery while takes medication.    DIET: To avoid nausea, slowly advance diet as tolerated, avoiding spicy or greasy foods for the first day.  Add more substantial food to your diet according to your physician's instructions.  Babies can be fed formula or breast milk as soon as they are hungry.  INCREASE FLUIDS AND FIBER TO AVOID CONSTIPATION.    SURGICAL DRESSING/BATHING: keep dressing dry    FOLLOW-UP APPOINTMENT:  A follow-up appointment should be arranged with your doctor in 7 to 10 days call to schedule.    You should CALL YOUR PHYSICIAN if you develop:  Fever greater than 101 degrees F.  Pain not relieved by medication, or persistent nausea or vomiting.  Excessive bleeding (blood soaking through dressing) or unexpected drainage from the wound.  Extreme redness or swelling around the incision site, drainage of pus or foul smelling drainage.  Inability to urinate or empty your bladder within 8 hours.  Problems with breathing or chest pain.    You should call 911 if you develop problems with breathing or chest pain.  If you are unable to contact your doctor or surgical center,  you should go to the nearest emergency room or urgent care center.  Physician's telephone #: [phone]  If any questions arise, call your doctor.  If your doctor is not available, please feel free to call the Surgical Center at [phone].     A registered nurse may call you a few days after your surgery to see how you are doing after your procedure.    MEDICATIONS: Resume taking daily medication.  Take prescribed pain medication with food.  If no medication is prescribed, you may take non-aspirin pain medication if needed.  PAIN MEDICATION CAN BE VERY CONSTIPATING.  Take a stool softener or laxative such as senokot, pericolace, or milk of magnesia if needed.      Last pain medication given at 6;14pm, 10mg oxycodone    If your physician has prescribed pain medication that includes Acetaminophen (Tylenol), do not take additional Acetaminophen (Tylenol) while taking the prescribed medication.    Depression / Suicide Risk    As you are discharged from this [facility] facility, it is important to learn how to keep safe from harming yourself.    Recognize the warning signs:  Abrupt changes in personality, positive or negative- including increase in energy   Giving away possessions  Change in eating patterns- significant weight changes-  positive or negative  Change in sleeping patterns- unable to sleep or sleeping all the time   Unwillingness or inability to communicate  Depression  Unusual sadness, discouragement and loneliness  Talk of wanting to die  Neglect of personal appearance   Rebelliousness- reckless behavior  Withdrawal from people/activities they love  Confusion- inability to concentrate     If you or a loved one observes any of these behaviors or has concerns about self-harm, here's what you can do:  Talk about it- your feelings and reasons for harming yourself  Remove any means that you might use to hurt yourself (examples: pills, rope, extension cords, firearm)  Get professional help from the  community (Mental Health, Substance Abuse, psychological counseling)  Do not be alone:Call your Safe Contact- someone whom you trust who will be there for you.  Call your local CRISIS HOTLINE 072-4518 or 123-135-6792  Call your local Children's Mobile Crisis Response Team [place] (234) 568-7204 or www.ShopVisible  Call the toll free National Suicide Prevention Hotlines   National Suicide Prevention Lifeline 994-827-IWEE (5814)  [place] [employer] 800-SUICIDE (653-4719)

## 2022-05-12 NOTE — OP REPORT
Date of surgery: 5/11/2022    Preoperative diagnosis:  1- left dorsal hand laceration from skill saw injury  2- left index and middle finger open metacarpal fractures  3- left extensor pollicis longus tendon disruption  4-left extensor pollicis brevis tendon disruption  5- left extensor indicis proprius tendon disruption  6-left extensor digitorum communist to the index finger disruption    Postoperative diagnosis: Same    Surgery performed:  1- left dorsal hand traumatic wound exploration with irrigation and excisional debridement  2- left index and middle finger open fracture irrigation and excisional debridement  3- open reduction internal fixation left index finger metacarpal fracture  4- left extensor digitorum commonest to the index finger and extensor indicis proprius tendon direct repair  5-left extensor pollicis longus and extensor pollicis brevis tendon direct repair  6- traumatic wound closure, simple x2 measuring 7 cm and 5 cm respectively    Surgeon: Stevenson Palma MD    Anesthesia: General    Complications: None    Findings: Complete disruption of the EPL, EPB, EIP and EDC to the index finger    Tourniquet time: 65 minutes    Tourniquet pressure 250 mils mercury    Estimated blood loss: 5 mL    Indication for procedure: Alok is a pleasant gentleman who sustained the above-mentioned injury from a skill saw roughly 1 week ago.  Due to the nature of his injury the decision was made to take him to the operating today for the above-mentioned procedures.    Description of procedure: On the date of surgery Alok was seen in the preoperative area where informed consent was obtained with all risks and benefits of the procedure explained and all questions answered.  He wished to proceed with the surgery.  Proper site was marked.  He was subsequently taken the operating room and placed in the supine position with all bony promises well-padded.  General endotracheal anesthesia was induced.  A tourniquet was  placed on the left upper extremity was then prepped and draped in the usual sterile fashion.    A timeout was performed with all persons in attendance agreeing on the proper patient, proper surgical site and proper surgery be performed.    An Esmarch was used to exsanguinate the left upper extremity the tourniquet was insufflated to 250 mmHg.  The traumatic wounds were then opened and explored.  I was able to visualize the distal tendon stumps of the EPL and EPB as well as the EIP and EDC tendons.  I was able to bluntly dissect down to the open fractures of the index and middle finger metacarpals.  The wounds were then thoroughly irrigated copious muscle normal saline.  Nonviable tissue was removed using tenotomy scissors.  The open fractures and the wound were again thoroughly irrigated copious amounts normal saline.    I then placed an Innate nail guidewire down the central axis of the index metacarpal traversing through the MP joint.  Position was verified using fluoroscopy.  I then made an incision at the insertion of the guidewire and measured for the appropriate length of the nail.  I then placed a 4.5 mm x 50 mm nail over the guidewire.  This was done while holding reduction of the metacarpal.  This had excellent purchase and fixation.  This position was verified using fluoroscopy.  Final x-rays were obtained.    Attention was then turned to isolating the proximal tendon stumps of the EPL, EPB, EIP and EDC to the index finger.  The traumatic wound over the dorsal aspect of the right thumb warranted extension in order to further visualize the tendon stumps in preparation for direct repair.  There was a traumatic laceration extending over over the dorsum of the thumb towards the middle finger metacarpal which measured 7 cm in length.  There was an additional traumatic wound just proximal to that 1 extending from the middle finger metacarpal to the dorsum of the ring finger proximal phalanx which measured  roughly 5 cm in length.  Once the tendon stumps were isolated I then placed a [place] stitch in each individual stump both proximally and distally with 4-0 FiberWire suture.  I then repaired the tendons using suture knots which had excellent reapproximation.  I then passed an additional 4-0 FiberWire in a figure-of-eight fashion to help bolster the strength of repair.  The wounds were then thoroughly irrigated copious amounts normal saline.  Deep soft tissues were repaired using 3-0 Monocryl.  The skin was then repaired using staples.  The wounds were then dressed with Xeroform, 4 x 4's, sterile cast padding and a well-padded volar resting splint with a thumb spica splint was placed to help maintain full extension of the index and thumb to take tension off of the repair.  The tourniquet was deflated.  General endotracheal anesthesia was reversed.  He was taken to the PACU in good and stable condition.    Disposition: He will be discharged home on a regular diet.  He will have a 1 pound lifting restriction of the left upper extremity.  He should apply ice and elevate as much possible for the next 7 to 10 days.  He will return to clinic at that time for repeat evaluation and x-rays.  He was prescribed narcotic pain medication and instructed not to drive or operate machinery while takes medication.

## 2022-05-13 ASSESSMENT — PAIN SCALES - GENERAL: PAIN_LEVEL: 4

## 2022-05-13 NOTE — ANESTHESIA POSTPROCEDURE EVALUATION
Patient: Alok Pickett    Procedure Summary     Date: 05/11/22 Room / Location:  OR  / SURGERY [facility]    Anesthesia Start: 1613 Anesthesia Stop: 1740    Procedures:       ORIF, FINGER - INDEX, METACARPAL (Left Finger)      INCISION AND DRAINAGE, HAND (Left Hand)      EXPLORATION, WOUND (Left Hand)      EXCISION, TRAPEZIUM, WITH LIGAMENT RECONSTRUCTION AND TENDON INTERPOSITION - EXTENSOR TENDON RECONSTRUCTION (Left Hand) Diagnosis: (FRACTURE OF METACARPAL BONE- LEFT)    Surgeons: Stevenson Palma M.D. Responsible Provider: Sherie Denise M.D.    Anesthesia Type: general ASA Status: 2          Final Anesthesia Type: general  Last vitals  BP   Blood Pressure : (!) 149/69    Temp   36.4 °C (97.5 °F)    Pulse   77   Resp   16    SpO2   94 %      Anesthesia Post Evaluation    Patient location during evaluation: PACU  Patient participation: complete - patient participated  Level of consciousness: awake and alert  Pain score: 4    Airway patency: patent  Anesthetic complications: no  Cardiovascular status: adequate and hemodynamically stable  Respiratory status: acceptable  Hydration status: acceptable    PONV: none          No complications documented.     Nurse Pain Score: 4 (NPRS)

## 2022-05-13 NOTE — ANESTHESIA TIME REPORT
Anesthesia Start and Stop Event Times     Date Time Event    5/11/2022 1609 Ready for Procedure     1613 Anesthesia Start     1740 Anesthesia Stop        Responsible Staff  05/11/22    Name Role Begin End    Sherie Denise M.D. Anesth 1613 1740        Overtime Reason:  overtime with call-back    Comments:

## (undated) DEVICE — SET EXTENSION WITH 2 PORTS (48EA/CA) ***PART #2C8610 IS A SUBSTITUTE*****

## (undated) DEVICE — DRAPE 36X28IN RAD CARM BND BG - (25/CA) O

## (undated) DEVICE — WATER IRRIGATION STERILE 1000ML (12EA/CA)

## (undated) DEVICE — SWAB CULTURE AMIES ESWAB (50EA/PK)

## (undated) DEVICE — HEAD HOLDER JUNIOR/ADULT

## (undated) DEVICE — WRAP CO-FLEX 4IN X 5YD STERIL - SELF-ADHERENT (18/CA)

## (undated) DEVICE — TUBE NG SALEM SUMP 16FR (50EA/CA)

## (undated) DEVICE — GLOVE BIOGEL INDICATOR SZ 8 SURGICAL PF LTX - (50/BX 4BX/CA)

## (undated) DEVICE — DRAPE LARGE 3 QUARTER - (20/CA)

## (undated) DEVICE — SUTURE GENERAL

## (undated) DEVICE — CANISTER SUCTION 3000ML MECHANICAL FILTER AUTO SHUTOFF MEDI-VAC NONSTERILE LF DISP  (40EA/CA)

## (undated) DEVICE — PADDING CAST 6 IN STERILE - 6 X 4 YDS (24/CA)

## (undated) DEVICE — SWAB ANAEROBIC SPEC.COLLECTOR - (25/PK 4PK/CA 100EA/CA)

## (undated) DEVICE — SUTURE 0 VICRYL PLUS CT-1 - 36 INCH (36/BX)

## (undated) DEVICE — SODIUM CHL. IRRIGATION 0.9% 3000ML (4EA/CA 65CA/PF)

## (undated) DEVICE — DRAPE FLUOROSCAN C-ARM - 54 X 78 (40EA/CA)

## (undated) DEVICE — ELECTRODE 850 FOAM ADHESIVE - HYDROGEL RADIOTRNSPRNT (50/PK)

## (undated) DEVICE — GLOVE, LITE (PAIR)

## (undated) DEVICE — DRESSING XEROFORM 1X8 - (50/BX 4BX/CA)

## (undated) DEVICE — GLOVE BIOGEL INDICATOR SZ 7.5 SURGICAL PF LTX - (50PR/BX 4BX/CA)

## (undated) DEVICE — KIT ANESTHESIA W/CIRCUIT & 3/LT BAG W/FILTER (20EA/CA)

## (undated) DEVICE — TUBING CLEARLINK DUO-VENT - C-FLO (48EA/CA)

## (undated) DEVICE — TOWEL STOP TIMEOUT SAFETY FLAG (40EA/CA)

## (undated) DEVICE — BAG SPONGE COUNT 10.25 X 32 - BLUE (250/CA)

## (undated) DEVICE — SET LEADWIRE 5 LEAD BEDSIDE DISPOSABLE ECG (1SET OF 5/EA)

## (undated) DEVICE — STERI STRIP COMPOUND BENZOIN - TINCTURE 0.6ML WITH APPLICATOR (40EA/BX)

## (undated) DEVICE — PAD LAP STERILE 18 X 18 - (5/PK 40PK/CA)

## (undated) DEVICE — SLEEVE, VASO, THIGH, MED

## (undated) DEVICE — HUMID-VENT HEAT AND MOISTURE EXCHANGE- (50/BX)

## (undated) DEVICE — PACK LOWER EXTREMITY - (2/CA)

## (undated) DEVICE — SET IRRIGATION CYSTOSCOPY TUBE L80 IN (20EA/CA)

## (undated) DEVICE — SODIUM CHL IRRIGATION 0.9% 1000ML (12EA/CA)

## (undated) DEVICE — SUTURE 3-0 ETHILON FS-1 - (36/BX) 30 INCH

## (undated) DEVICE — GLOVE BIOGEL PI ORTHO SZ 7.5 PF LF (40PR/BX)

## (undated) DEVICE — DRAPE U ORTHOPEDIC - (10/BX)

## (undated) DEVICE — SLING ORTH UNV TIETX VLFM ARM

## (undated) DEVICE — CHLORAPREP 26 ML APPLICATOR - ORANGE TINT(25/CA)

## (undated) DEVICE — NEPTUNE 4 PORT MANIFOLD - (20/PK)

## (undated) DEVICE — LACTATED RINGERS INJ 1000 ML - (14EA/CA 60CA/PF)

## (undated) DEVICE — GLOVE BIOGEL SZ 8 SURGICAL PF LTX - (50PR/BX 4BX/CA)

## (undated) DEVICE — TOWELS CLOTH SURGICAL - (4/PK 20PK/CA)

## (undated) DEVICE — SUCTION INSTRUMENT YANKAUER BULBOUS TIP W/O VENT (50EA/CA)

## (undated) DEVICE — HANDPIECE 10FT INTPLS SCT PLS IRRIGATION HAND CONTROL SET (6/PK)

## (undated) DEVICE — BOVIE BLADE COATED - (50/PK)

## (undated) DEVICE — PACK UPPER EXTREMITY SM OR - (3/CA)

## (undated) DEVICE — PAD PREP 24 X 48 CUFFED - (100/CA)

## (undated) DEVICE — BANDAGE ELASTIC 4 HONEYCOMB - 4"X5YD LF (20/CA)"

## (undated) DEVICE — SENSOR SPO2 ADULT LNCS ADTX (20/BX) ORDER ITEM #19593

## (undated) DEVICE — Device

## (undated) DEVICE — DRAPE SURGICAL U 77X120 - (10/CA)

## (undated) DEVICE — BANDAGE ELASTIC 2 IN X 5 YDS - (10EA/BX 5BX/CA)

## (undated) DEVICE — PADDING CAST 4 IN STERILE - 4 X 4 YDS (24/CA)

## (undated) DEVICE — CONTAINER, SPECIMEN, STERILE

## (undated) DEVICE — SENSOR SPO2 NEO LNCS ADHESIVE (20/BX) SEE USER NOTES

## (undated) DEVICE — GOWN WARMING STANDARD FLEX - (30/CA)

## (undated) DEVICE — TIP INTPLS HFLO ML ORFC BTRY - (12/CS)  FOR SURGILAV

## (undated) DEVICE — SUTURE 4-0 ETHILON PS-2 18 (12PK/BX)"

## (undated) DEVICE — CANISTER SUCTION RIGID RED 1500CC (40EA/CA)

## (undated) DEVICE — TUBE CONNECTING SUCTION - CLEAR PLASTIC STERILE 72 IN (50EA/CA)

## (undated) DEVICE — SUTURE ETHILON 2-0 FSLX 30 (36PK/BX)"

## (undated) DEVICE — DRESSING PETROLEUM GAUZE 5 X 9" (50EA/BX 4BX/CA)"

## (undated) DEVICE — FIBERWIRE 4-0 - (12/BX)

## (undated) DEVICE — DRESSING TRANSPARENT FILM TEGADERM 2.375 X 2.75"  (100EA/BX)"

## (undated) DEVICE — MASK ANESTHESIA ADULT  - (100/CA)

## (undated) DEVICE — STAPLER SKIN DISP - (6/BX 10BX/CA) VISISTAT

## (undated) DEVICE — TRAY SKIN SCRUB PVP WET (20EA/CA) PART #DYND70356 DISCONTINUED

## (undated) DEVICE — DRAPE SURG STERI-DRAPE 7X11OD - (40EA/CA)

## (undated) DEVICE — BLADE BEAVER 6400 MINI EYE ROUND TIP SHARP ON ONE SIDE (20/CA)

## (undated) DEVICE — PROTECTOR ULNA NERVE - (36PR/CA)

## (undated) DEVICE — TUBE CONNECT SUCTION CLEAR 120 X 1/4" (50EA/CA)"

## (undated) DEVICE — ELECTRODE DUAL RETURN W/ CORD - (50/PK)

## (undated) DEVICE — SPONGE DRAIN 4 X 4IN 6-PLY - (2/PK25PK/BX12BX/CS)

## (undated) DEVICE — SUTURE 2-0 VICRYL PLUS CT-1 36 (36PK/BX)"

## (undated) DEVICE — BLADE SURGICAL #15 - (50/BX 3BX/CA)